# Patient Record
Sex: FEMALE | Race: WHITE | NOT HISPANIC OR LATINO | Employment: FULL TIME | URBAN - METROPOLITAN AREA
[De-identification: names, ages, dates, MRNs, and addresses within clinical notes are randomized per-mention and may not be internally consistent; named-entity substitution may affect disease eponyms.]

---

## 2023-09-07 ENCOUNTER — OFFICE VISIT (OUTPATIENT)
Dept: PRIMARY CARE | Facility: CLINIC | Age: 58
End: 2023-09-07
Payer: COMMERCIAL

## 2023-09-07 ENCOUNTER — LAB (OUTPATIENT)
Dept: LAB | Facility: LAB | Age: 58
End: 2023-09-07
Payer: COMMERCIAL

## 2023-09-07 VITALS
TEMPERATURE: 96.2 F | BODY MASS INDEX: 21.89 KG/M2 | WEIGHT: 131.4 LBS | SYSTOLIC BLOOD PRESSURE: 112 MMHG | HEIGHT: 65 IN | DIASTOLIC BLOOD PRESSURE: 70 MMHG | HEART RATE: 54 BPM

## 2023-09-07 DIAGNOSIS — Z12.11 ENCOUNTER FOR SCREENING FOR MALIGNANT NEOPLASM OF COLON: ICD-10-CM

## 2023-09-07 DIAGNOSIS — Z13.1 SCREENING FOR DIABETES MELLITUS: ICD-10-CM

## 2023-09-07 DIAGNOSIS — Z00.00 ANNUAL PHYSICAL EXAM: Primary | ICD-10-CM

## 2023-09-07 DIAGNOSIS — Z00.00 ANNUAL PHYSICAL EXAM: ICD-10-CM

## 2023-09-07 DIAGNOSIS — Z12.31 SCREENING MAMMOGRAM FOR BREAST CANCER: ICD-10-CM

## 2023-09-07 DIAGNOSIS — Z13.6 SCREENING FOR CARDIOVASCULAR CONDITION: ICD-10-CM

## 2023-09-07 LAB
ALANINE AMINOTRANSFERASE (SGPT) (U/L) IN SER/PLAS: 34 U/L (ref 7–45)
ALBUMIN (G/DL) IN SER/PLAS: 4.7 G/DL (ref 3.4–5)
ALKALINE PHOSPHATASE (U/L) IN SER/PLAS: 73 U/L (ref 33–110)
ANION GAP IN SER/PLAS: 12 MMOL/L (ref 10–20)
ASPARTATE AMINOTRANSFERASE (SGOT) (U/L) IN SER/PLAS: 27 U/L (ref 9–39)
BILIRUBIN TOTAL (MG/DL) IN SER/PLAS: 0.7 MG/DL (ref 0–1.2)
CALCIUM (MG/DL) IN SER/PLAS: 10.4 MG/DL (ref 8.6–10.6)
CARBON DIOXIDE, TOTAL (MMOL/L) IN SER/PLAS: 30 MMOL/L (ref 21–32)
CHLORIDE (MMOL/L) IN SER/PLAS: 106 MMOL/L (ref 98–107)
CHOLESTEROL (MG/DL) IN SER/PLAS: 213 MG/DL (ref 0–199)
CHOLESTEROL IN HDL (MG/DL) IN SER/PLAS: 101.2 MG/DL
CHOLESTEROL/HDL RATIO: 2.1
CREATININE (MG/DL) IN SER/PLAS: 0.81 MG/DL (ref 0.5–1.05)
ERYTHROCYTE DISTRIBUTION WIDTH (RATIO) BY AUTOMATED COUNT: 13.2 % (ref 11.5–14.5)
ERYTHROCYTE MEAN CORPUSCULAR HEMOGLOBIN CONCENTRATION (G/DL) BY AUTOMATED: 31.9 G/DL (ref 32–36)
ERYTHROCYTE MEAN CORPUSCULAR VOLUME (FL) BY AUTOMATED COUNT: 95 FL (ref 80–100)
ERYTHROCYTES (10*6/UL) IN BLOOD BY AUTOMATED COUNT: 4.84 X10E12/L (ref 4–5.2)
ESTIMATED AVERAGE GLUCOSE FOR HBA1C: 108 MG/DL
GFR FEMALE: 83 ML/MIN/1.73M2
GLUCOSE (MG/DL) IN SER/PLAS: 84 MG/DL (ref 74–99)
HEMATOCRIT (%) IN BLOOD BY AUTOMATED COUNT: 46.1 % (ref 36–46)
HEMOGLOBIN (G/DL) IN BLOOD: 14.7 G/DL (ref 12–16)
HEMOGLOBIN A1C/HEMOGLOBIN TOTAL IN BLOOD: 5.4 %
LDL: 99 MG/DL (ref 0–99)
LEUKOCYTES (10*3/UL) IN BLOOD BY AUTOMATED COUNT: 4.5 X10E9/L (ref 4.4–11.3)
NRBC (PER 100 WBCS) BY AUTOMATED COUNT: 0 /100 WBC (ref 0–0)
PLATELETS (10*3/UL) IN BLOOD AUTOMATED COUNT: 226 X10E9/L (ref 150–450)
POTASSIUM (MMOL/L) IN SER/PLAS: 4.8 MMOL/L (ref 3.5–5.3)
PROTEIN TOTAL: 7.4 G/DL (ref 6.4–8.2)
SODIUM (MMOL/L) IN SER/PLAS: 143 MMOL/L (ref 136–145)
TRIGLYCERIDE (MG/DL) IN SER/PLAS: 62 MG/DL (ref 0–149)
UREA NITROGEN (MG/DL) IN SER/PLAS: 15 MG/DL (ref 6–23)
VLDL: 12 MG/DL (ref 0–40)

## 2023-09-07 PROCEDURE — 80061 LIPID PANEL: CPT

## 2023-09-07 PROCEDURE — 1036F TOBACCO NON-USER: CPT | Performed by: INTERNAL MEDICINE

## 2023-09-07 PROCEDURE — 80053 COMPREHEN METABOLIC PANEL: CPT

## 2023-09-07 PROCEDURE — 85027 COMPLETE CBC AUTOMATED: CPT

## 2023-09-07 PROCEDURE — 83036 HEMOGLOBIN GLYCOSYLATED A1C: CPT

## 2023-09-07 PROCEDURE — 36415 COLL VENOUS BLD VENIPUNCTURE: CPT

## 2023-09-07 PROCEDURE — 99396 PREV VISIT EST AGE 40-64: CPT | Performed by: INTERNAL MEDICINE

## 2023-09-07 ASSESSMENT — ENCOUNTER SYMPTOMS
COUGH: 0
FEVER: 0
POLYDIPSIA: 0
PALPITATIONS: 0
SHORTNESS OF BREATH: 0
CHILLS: 0

## 2023-09-07 NOTE — PROGRESS NOTES
"Subjective   Patient ID: Magalys Kay is a 58 y.o. female who presents for Annual Exam.    Patient presents today for an annual wellness exam    Medical history and surgical history updated today  Medication list reconciled and updated  Patient denies vision issues at this time  Patient denies hearing issues at this time  Current diet: balanced  Current exercise habit: regular. Tennis and training.   Follows with a dentist: Yes    Patient counseled about available preventative health vaccinations and provided with opportunity to update them with our office or through prescription to preferred pharmacy.    Reviewed and discussed preventative health screening recommendations for colon cancer    Reviewed and discussed preventative health recommendations for screening for cervical cancer and breast cancer          Review of Systems   Constitutional:  Negative for chills and fever.   Respiratory:  Negative for cough and shortness of breath.    Cardiovascular:  Negative for chest pain and palpitations.   Endocrine: Negative for polydipsia and polyuria.       Objective   /70 (BP Location: Right arm, Patient Position: Sitting, BP Cuff Size: Adult)   Pulse 54   Temp 35.7 °C (96.2 °F) (Temporal)   Ht 1.651 m (5' 5\")   Wt 59.6 kg (131 lb 6.4 oz)   BMI 21.87 kg/m²     Physical Exam  Constitutional:       Appearance: Normal appearance.   HENT:      Head: Normocephalic and atraumatic.      Right Ear: Tympanic membrane normal.      Left Ear: Tympanic membrane normal.      Nose: Nose normal.      Mouth/Throat:      Mouth: Mucous membranes are moist.   Eyes:      Extraocular Movements: Extraocular movements intact.      Conjunctiva/sclera: Conjunctivae normal.      Pupils: Pupils are equal, round, and reactive to light.   Neck:      Thyroid: No thyroid mass, thyromegaly or thyroid tenderness.      Vascular: No carotid bruit.   Cardiovascular:      Rate and Rhythm: Normal rate and regular rhythm.      Heart sounds: No " murmur heard.     No friction rub. No gallop.   Pulmonary:      Effort: Pulmonary effort is normal. No respiratory distress.      Breath sounds: No wheezing, rhonchi or rales.   Abdominal:      General: Bowel sounds are normal.      Palpations: Abdomen is soft.      Tenderness: There is no abdominal tenderness. There is no guarding.      Hernia: No hernia is present.   Musculoskeletal:      Cervical back: Neck supple. No tenderness.      Right lower leg: No edema.      Left lower leg: No edema.   Lymphadenopathy:      Cervical: No cervical adenopathy.   Skin:     Coloration: Skin is not jaundiced.   Neurological:      General: No focal deficit present.      Mental Status: She is alert and oriented to person, place, and time.   Psychiatric:         Mood and Affect: Mood normal.         Assessment/Plan   Problem List Items Addressed This Visit    None  Visit Diagnoses       Annual physical exam    -  Primary    Relevant Orders    Lipid Panel    CBC    Comprehensive Metabolic Panel    Hemoglobin A1C    Screening for cardiovascular condition        Relevant Orders    Lipid Panel    CBC    Comprehensive Metabolic Panel    Hemoglobin A1C    Screening mammogram for breast cancer        Relevant Orders    BI mammo bilateral screening tomosynthesis    Encounter for screening for malignant neoplasm of colon        Relevant Orders    Colonoscopy Screening    Screening for diabetes mellitus        Relevant Orders    Lipid Panel    CBC    Comprehensive Metabolic Panel    Hemoglobin A1C

## 2023-09-08 NOTE — RESULT ENCOUNTER NOTE
Patient labs are excellent.  Her cholesterol is extremely good her good cholesterol is high at 101 and her bad cholesterol is well controlled at 99.  Her good cholesterol exceeds her bad cholesterol.  It certainly is not abnormal in a bad way and approach is nothing remotely like what we discussed at the appointment on that screening she did.  Her levels are perfect so is the rest of her blood work there were no concerns here.  Patient's physical paperwork completed

## 2023-10-19 DIAGNOSIS — Z12.11 SCREENING FOR COLON CANCER: Primary | ICD-10-CM

## 2023-10-19 RX ORDER — POLYETHYLENE GLYCOL 3350, SODIUM SULFATE ANHYDROUS, SODIUM BICARBONATE, SODIUM CHLORIDE, POTASSIUM CHLORIDE 236; 22.74; 6.74; 5.86; 2.97 G/4L; G/4L; G/4L; G/4L; G/4L
4000 POWDER, FOR SOLUTION ORAL ONCE
Qty: 4000 ML | Refills: 0 | Status: SHIPPED | OUTPATIENT
Start: 2023-10-19 | End: 2023-10-19

## 2023-12-08 ENCOUNTER — OFFICE VISIT (OUTPATIENT)
Dept: GASTROENTEROLOGY | Facility: EXTERNAL LOCATION | Age: 58
End: 2023-12-08
Payer: COMMERCIAL

## 2023-12-08 DIAGNOSIS — Z83.719 FAMILY HISTORY OF POLYPS IN THE COLON: ICD-10-CM

## 2023-12-08 DIAGNOSIS — Z12.11 ENCOUNTER FOR SCREENING FOR MALIGNANT NEOPLASM OF COLON: ICD-10-CM

## 2023-12-08 DIAGNOSIS — D12.5 BENIGN NEOPLASM OF SIGMOID COLON: ICD-10-CM

## 2023-12-08 DIAGNOSIS — Z12.11 ENCOUNTER FOR SCREENING FOR MALIGNANT NEOPLASM OF COLON: Primary | ICD-10-CM

## 2023-12-08 PROCEDURE — 1036F TOBACCO NON-USER: CPT | Performed by: INTERNAL MEDICINE

## 2023-12-08 PROCEDURE — 45385 COLONOSCOPY W/LESION REMOVAL: CPT | Performed by: INTERNAL MEDICINE

## 2023-12-08 PROCEDURE — 0753T DGTZ GLS MCRSCP SLD LEVEL IV: CPT

## 2023-12-08 PROCEDURE — 88305 TISSUE EXAM BY PATHOLOGIST: CPT | Performed by: PATHOLOGY

## 2023-12-08 PROCEDURE — 88305 TISSUE EXAM BY PATHOLOGIST: CPT

## 2023-12-08 NOTE — PROGRESS NOTES
Patient had colonoscopy due to family history of polyps and 2 small polyps were removed.  Mild diverticulosis.  I recommend repeat exam in 5 years if any of the polyps are adenomas.

## 2023-12-12 ENCOUNTER — LAB REQUISITION (OUTPATIENT)
Dept: LAB | Facility: HOSPITAL | Age: 58
End: 2023-12-12
Payer: COMMERCIAL

## 2023-12-15 LAB
LABORATORY COMMENT REPORT: NORMAL
PATH REPORT.FINAL DX SPEC: NORMAL
PATH REPORT.GROSS SPEC: NORMAL
PATH REPORT.RELEVANT HX SPEC: NORMAL
PATH REPORT.TOTAL CANCER: NORMAL

## 2023-12-19 ENCOUNTER — OFFICE VISIT (OUTPATIENT)
Dept: DERMATOLOGY | Facility: CLINIC | Age: 58
End: 2023-12-19
Payer: COMMERCIAL

## 2023-12-19 DIAGNOSIS — L81.4 LENTIGO: ICD-10-CM

## 2023-12-19 DIAGNOSIS — D22.9 MULTIPLE BENIGN NEVI: ICD-10-CM

## 2023-12-19 DIAGNOSIS — L85.3 XEROSIS CUTIS: ICD-10-CM

## 2023-12-19 DIAGNOSIS — Z85.828 PERSONAL HISTORY OF SKIN CANCER: ICD-10-CM

## 2023-12-19 DIAGNOSIS — D48.5 NEOPLASM OF UNCERTAIN BEHAVIOR OF SKIN: Primary | ICD-10-CM

## 2023-12-19 DIAGNOSIS — L82.1 SEBORRHEIC KERATOSIS: ICD-10-CM

## 2023-12-19 DIAGNOSIS — L57.8 ACTINIC SKIN DAMAGE: ICD-10-CM

## 2023-12-19 PROBLEM — Z80.8 FAMILY HISTORY OF MELANOMA: Status: ACTIVE | Noted: 2023-12-19

## 2023-12-19 PROBLEM — D23.9 MULTIPLE DYSPLASTIC NEVI: Status: ACTIVE | Noted: 2023-12-19

## 2023-12-19 PROCEDURE — 11102 TANGNTL BX SKIN SINGLE LES: CPT | Performed by: DERMATOLOGY

## 2023-12-19 PROCEDURE — 99213 OFFICE O/P EST LOW 20 MIN: CPT | Performed by: DERMATOLOGY

## 2023-12-19 PROCEDURE — 1036F TOBACCO NON-USER: CPT | Performed by: DERMATOLOGY

## 2023-12-19 PROCEDURE — 88305 TISSUE EXAM BY PATHOLOGIST: CPT | Mod: TC,DER | Performed by: DERMATOLOGY

## 2023-12-19 PROCEDURE — 88305 TISSUE EXAM BY PATHOLOGIST: CPT | Performed by: DERMATOLOGY

## 2023-12-19 ASSESSMENT — PATIENT GLOBAL ASSESSMENT (PGA): PATIENT GLOBAL ASSESSMENT: PATIENT GLOBAL ASSESSMENT:  1 - CLEAR

## 2023-12-19 ASSESSMENT — ITCH NUMERIC RATING SCALE: HOW SEVERE IS YOUR ITCHING?: 0

## 2023-12-19 ASSESSMENT — DERMATOLOGY QUALITY OF LIFE (QOL) ASSESSMENT
ARE THERE EXCLUSIONS OR EXCEPTIONS FOR THE QUALITY OF LIFE ASSESSMENT: NO
RATE HOW BOTHERED YOU ARE BY SYMPTOMS OF YOUR SKIN PROBLEM (EG, ITCHING, STINGING BURNING, HURTING OR SKIN IRRITATION): 0 - NEVER BOTHERED
RATE HOW EMOTIONALLY BOTHERED YOU ARE BY YOUR SKIN PROBLEM (FOR EXAMPLE, WORRY, EMBARRASSMENT, FRUSTRATION): 0 - NEVER BOTHERED
DATE THE QUALITY-OF-LIFE ASSESSMENT WAS COMPLETED: 66827
RATE HOW BOTHERED YOU ARE BY EFFECTS OF YOUR SKIN PROBLEMS ON YOUR ACTIVITIES (EG, GOING OUT, ACCOMPLISHING WHAT YOU WANT, WORK ACTIVITIES OR YOUR RELATIONSHIPS WITH OTHERS): 0 - NEVER BOTHERED

## 2023-12-19 ASSESSMENT — DERMATOLOGY PATIENT ASSESSMENT
HAVE YOU HAD OR DO YOU HAVE VASCULAR DISEASE: NO
DO YOU USE A TANNING BED: YES, PREVIOUSLY
ARE YOU TRYING TO GET PREGNANT: NO
DO YOU USE SUNSCREEN: OCCASIONALLY
DO YOU HAVE ANY NEW OR CHANGING LESIONS: NO
ARE YOU AN ORGAN TRANSPLANT RECIPIENT: NO
ARE YOU ON BIRTH CONTROL: NO
HAVE YOU HAD OR DO YOU HAVE A STAPH INFECTION: NO
DO YOU HAVE IRREGULAR MENSTRUAL CYCLES: NO

## 2023-12-19 NOTE — PROGRESS NOTES
Subjective     Magalys Kay is a 58 y.o. female who presents for the following: Skin Check.     Review of Systems:  No other skin or systemic complaints other than what is documented elsewhere in the note.    The following portions of the chart were reviewed this encounter and updated as appropriate:  Tobacco  Allergies  Meds  Problems  Med Hx  Surg Hx  Fam Hx         Skin Cancer History  No skin cancer on file.      Specialty Problems          Dermatology Problems    Family history of melanoma     Family hx of esophageal melanoma diagnosed at age 60 in her father and brother had amelanoma excised at 45          History of nonmelanoma skin cancer     Discussed niacinamide as prophy    Lesion 1: BCC.Year Diagnosed: 2014. August.Location:  Right Lower Leg.  medial aspectTreatment(s):  Electrodessication and curretage (not here)    Lesion 2: Superficial Basal Cell Carcinoma.  Deep margins involved.Year Diagnosed:  2020. June.Location:  Right Posterior Upper Arm.Treatment(s): ED&C.  Treated by: Leeanne Madsen MD 8/27/2020Pathology: [id number]    Lesion 3: Superficial Basal Cell Carcinoma.  Deep margins involved.Year Diagnosed:  2021. June.Location:  Left Upper Arm.Treatment(s): Treated by: Leeanne Madsen MD 7/28/2021 ED&CPathology: [id number]    Lesion 4: Nodular Basal Cell Carcinoma.  Deep margins involved.Year Diagnosed:  2022. December.Location:  Left [place].Treatment(s): mohsSurgeon: Rudolph2/1/23Pathology: [id number]          Multiple dysplastic nevi     Hx of multiple dyplastic nevi, acral compound nevi with pagetoid extension s/p re-excision onright dorsal foot in 2015              Objective   Well appearing patient in no apparent distress; mood and affect are within normal limits.    A full examination was performed including scalp, head, eyes, ears, nose, lips, neck, chest, axillae, abdomen, back, buttocks, bilateral upper extremities, bilateral lower extremities, hands, feet, fingers, toes,  fingernails, and toenails. All findings within normal limits unless otherwise noted below.    Assessment/Plan   1. Neoplasm of uncertain behavior of skin  Left Buccal Cheek  4 mm pink flat-topped papule with yellow globules and pink white  on dermosocpy          Lesion biopsy  Type of biopsy: tangential    Informed consent: discussed and consent obtained    Timeout: patient name, date of birth, surgical site, and procedure verified    Procedure prep:  Patient was prepped and draped  Anesthesia: the lesion was anesthetized in a standard fashion    Anesthetic:  1% lidocaine w/ epinephrine 1-100,000 local infiltration  Instrument used: DermaBlade    Hemostasis achieved with: aluminum chloride    Outcome: patient tolerated procedure well    Post-procedure details: sterile dressing applied and wound care instructions given    Dressing type: petrolatum and bandage      Staff Communication: Dermatology Local Anesthesia: 1 % Lidocaine / Epinephrine - Amount: 1.5 ml    Specimen 1 - Dermatopathology- DERM LAB  Differential Diagnosis: folliculitis vs seb hyp vs basal cell carcinoma   Check Margins Yes/No?:    Comments:    Dermpath Lab: Routine Histopathology (formalin-fixed tissue)    2. Actinic skin damage  Right Dorsal Hand  Background of photodamage with hyper- and hypo-pigmented macules on the skin    AK biopsied at last visit completely resolved    3. Multiple benign nevi  Brown and tan macules and papules with reassuring findings on dermoscopy    -These lesions have benign, reassuring patterns on dermoscopy  -Recommend continued self observation, and to contact the office if any changes in nevi are noticed    4. Lentigo  Tan macules    -Benign appearing on exam  -Reassurance, recommend observation    5. Seborrheic keratosis  Stuck on, waxy macule(s)/papule(s)/plaque(s) with comedo-like openings and milia like cysts    -Discussed the nature of the diagnosis  -Reassurance, recommend continued observation    6. Personal  history of skin cancer    Personal History of Non-Melanoma Skin Cancer  -Well healed scar(s) with no evidence of recurrence  -Discussed the need for annual or semi-annual skin examinations and to return sooner if any new or changing lesions are noticed. Patient verbalizes understanding    Related Procedures  Follow Up In Dermatology - Established Patient    7. Xerosis cutis  Dry skin with erythema and dry river bed cracking    -Discussed nature of condition  -Discussed gentle skin care habits     - change to Dove soap and CeraVe cream  - shorten showers and not too hot  - info in AVS  - if these changes do not help, can request triamcinolone 0.1% cream BID x2 weeks          Follow up 6 months Full Skin Exam or sooner pending path; if path normal could consider spacing out after next visit

## 2023-12-21 LAB
LABORATORY COMMENT REPORT: NORMAL
PATH REPORT.FINAL DX SPEC: NORMAL
PATH REPORT.GROSS SPEC: NORMAL
PATH REPORT.MICROSCOPIC SPEC OTHER STN: NORMAL
PATH REPORT.RELEVANT HX SPEC: NORMAL
PATH REPORT.TOTAL CANCER: NORMAL

## 2023-12-26 ENCOUNTER — TELEPHONE (OUTPATIENT)
Dept: DERMATOLOGY | Facility: CLINIC | Age: 58
End: 2023-12-26
Payer: COMMERCIAL

## 2024-01-08 ENCOUNTER — ANCILLARY PROCEDURE (OUTPATIENT)
Dept: RADIOLOGY | Facility: CLINIC | Age: 59
End: 2024-01-08
Payer: COMMERCIAL

## 2024-01-08 DIAGNOSIS — Z12.31 SCREENING MAMMOGRAM FOR BREAST CANCER: ICD-10-CM

## 2024-01-08 PROCEDURE — 77067 SCR MAMMO BI INCL CAD: CPT | Mod: BILATERAL PROCEDURE | Performed by: RADIOLOGY

## 2024-01-08 PROCEDURE — 77063 BREAST TOMOSYNTHESIS BI: CPT | Mod: BILATERAL PROCEDURE | Performed by: RADIOLOGY

## 2024-01-08 PROCEDURE — 77063 BREAST TOMOSYNTHESIS BI: CPT

## 2024-01-11 ENCOUNTER — OFFICE VISIT (OUTPATIENT)
Dept: PRIMARY CARE | Facility: CLINIC | Age: 59
End: 2024-01-11
Payer: COMMERCIAL

## 2024-01-11 VITALS
BODY MASS INDEX: 22.82 KG/M2 | DIASTOLIC BLOOD PRESSURE: 66 MMHG | HEART RATE: 63 BPM | HEIGHT: 65 IN | WEIGHT: 137 LBS | SYSTOLIC BLOOD PRESSURE: 106 MMHG | TEMPERATURE: 98 F

## 2024-01-11 DIAGNOSIS — R05.2 SUBACUTE COUGH: Primary | ICD-10-CM

## 2024-01-11 PROCEDURE — 1036F TOBACCO NON-USER: CPT | Performed by: INTERNAL MEDICINE

## 2024-01-11 PROCEDURE — 99213 OFFICE O/P EST LOW 20 MIN: CPT | Performed by: INTERNAL MEDICINE

## 2024-01-11 PROCEDURE — 87631 RESP VIRUS 3-5 TARGETS: CPT

## 2024-01-11 RX ORDER — CODEINE PHOSPHATE AND GUAIFENESIN 10; 100 MG/5ML; MG/5ML
5 SOLUTION ORAL NIGHTLY PRN
Qty: 50 ML | Refills: 0 | Status: SHIPPED | OUTPATIENT
Start: 2024-01-11 | End: 2024-01-18

## 2024-01-11 RX ORDER — BENZONATATE 100 MG/1
100 CAPSULE ORAL 3 TIMES DAILY PRN
Qty: 21 CAPSULE | Refills: 0 | Status: SHIPPED | OUTPATIENT
Start: 2024-01-11 | End: 2024-01-18

## 2024-01-11 RX ORDER — PREDNISONE 10 MG/1
TABLET ORAL
Qty: 18 TABLET | Refills: 0 | Status: SHIPPED | OUTPATIENT
Start: 2024-01-11 | End: 2024-06-03 | Stop reason: WASHOUT

## 2024-01-11 RX ORDER — DOXYCYCLINE 100 MG/1
100 CAPSULE ORAL 2 TIMES DAILY
Qty: 14 CAPSULE | Refills: 0 | Status: SHIPPED | OUTPATIENT
Start: 2024-01-11 | End: 2024-01-18

## 2024-01-11 ASSESSMENT — ENCOUNTER SYMPTOMS
PALPITATIONS: 0
FATIGUE: 1
SHORTNESS OF BREATH: 0
COUGH: 1
CHILLS: 0
POLYDIPSIA: 0
FEVER: 0

## 2024-01-11 NOTE — PROGRESS NOTES
"Subjective   Patient ID: Magalys Kay is a 58 y.o. female who presents for Cough (Since last week).    1 week ago, throat tickle with a cough onset 24 hours later. Cough worsening over that time. Deep cough. Mucus in throat but not enough to get out.  Positive for fatigue negative for chills sweats fevers severe shortness of breath.  Cough reduced to some chest pain but no chest pain otherwise.    Cough  Pertinent negatives include no chest pain, chills, fever or shortness of breath.        Review of Systems   Constitutional:  Positive for fatigue. Negative for chills and fever.   Respiratory:  Positive for cough. Negative for shortness of breath.    Cardiovascular:  Negative for chest pain and palpitations.   Endocrine: Negative for polydipsia and polyuria.       Objective   /66 (BP Location: Right arm, Patient Position: Sitting, BP Cuff Size: Adult)   Pulse 63   Temp 36.7 °C (98 °F) (Temporal)   Ht 1.651 m (5' 5\")   Wt 62.1 kg (137 lb)   BMI 22.80 kg/m²     Physical Exam  Constitutional:       Appearance: Normal appearance.   HENT:      Head: Normocephalic and atraumatic.   Eyes:      Extraocular Movements: Extraocular movements intact.      Pupils: Pupils are equal, round, and reactive to light.   Neck:      Thyroid: No thyroid mass or thyromegaly.      Vascular: No carotid bruit.   Cardiovascular:      Rate and Rhythm: Normal rate and regular rhythm.      Heart sounds: No murmur heard.     No friction rub. No gallop.   Pulmonary:      Effort: No respiratory distress.      Breath sounds: Rhonchi present. No wheezing or rales.   Musculoskeletal:      Cervical back: Neck supple.      Right lower leg: No edema.      Left lower leg: No edema.   Lymphadenopathy:      Cervical: No cervical adenopathy.   Neurological:      Mental Status: She is alert.         Assessment/Plan   Problem List Items Addressed This Visit    None  Visit Diagnoses         Codes    Subacute cough    -  Primary R05.2    Relevant " Medications    predniSONE (Deltasone) 10 mg tablet    benzonatate (Tessalon) 100 mg capsule    codeine-guaifenesin (Robitussin-AC)  mg/5 mL syrup    doxycycline (Vibramycin) 100 mg capsule    Other Relevant Orders    Influenza A, and B PCR    RSV PCR

## 2024-01-12 LAB
FLUAV RNA RESP QL NAA+PROBE: NOT DETECTED
FLUBV RNA RESP QL NAA+PROBE: NOT DETECTED
RSV RNA RESP QL NAA+PROBE: NOT DETECTED

## 2024-06-03 ENCOUNTER — LAB (OUTPATIENT)
Dept: LAB | Facility: LAB | Age: 59
End: 2024-06-03
Payer: COMMERCIAL

## 2024-06-03 ENCOUNTER — OFFICE VISIT (OUTPATIENT)
Dept: PRIMARY CARE | Facility: CLINIC | Age: 59
End: 2024-06-03
Payer: COMMERCIAL

## 2024-06-03 ENCOUNTER — HOSPITAL ENCOUNTER (OUTPATIENT)
Dept: RADIOLOGY | Facility: CLINIC | Age: 59
Discharge: HOME | End: 2024-06-03
Payer: COMMERCIAL

## 2024-06-03 VITALS
WEIGHT: 138 LBS | DIASTOLIC BLOOD PRESSURE: 59 MMHG | SYSTOLIC BLOOD PRESSURE: 118 MMHG | HEART RATE: 51 BPM | BODY MASS INDEX: 22.96 KG/M2 | TEMPERATURE: 97.3 F

## 2024-06-03 DIAGNOSIS — M79.644 PAIN IN FINGER OF RIGHT HAND: ICD-10-CM

## 2024-06-03 DIAGNOSIS — R10.10 PAIN OF UPPER ABDOMEN: Primary | ICD-10-CM

## 2024-06-03 DIAGNOSIS — R19.5 LOOSE STOOLS: ICD-10-CM

## 2024-06-03 DIAGNOSIS — R10.10 PAIN OF UPPER ABDOMEN: ICD-10-CM

## 2024-06-03 DIAGNOSIS — M79.641 RIGHT HAND PAIN: ICD-10-CM

## 2024-06-03 LAB
ALBUMIN SERPL BCP-MCNC: 4.5 G/DL (ref 3.4–5)
ALP SERPL-CCNC: 65 U/L (ref 33–136)
ALT SERPL W P-5'-P-CCNC: 19 U/L (ref 7–45)
ANION GAP SERPL CALC-SCNC: 12 MMOL/L (ref 10–20)
AST SERPL W P-5'-P-CCNC: 19 U/L (ref 9–39)
BASOPHILS # BLD AUTO: 0.03 X10*3/UL (ref 0–0.1)
BASOPHILS NFR BLD AUTO: 0.7 %
BILIRUB SERPL-MCNC: 0.5 MG/DL (ref 0–1.2)
BUN SERPL-MCNC: 17 MG/DL (ref 6–23)
CALCIUM SERPL-MCNC: 10.2 MG/DL (ref 8.6–10.6)
CHLORIDE SERPL-SCNC: 104 MMOL/L (ref 98–107)
CO2 SERPL-SCNC: 29 MMOL/L (ref 21–32)
CREAT SERPL-MCNC: 0.76 MG/DL (ref 0.5–1.05)
EGFRCR SERPLBLD CKD-EPI 2021: 90 ML/MIN/1.73M*2
EOSINOPHIL # BLD AUTO: 0.07 X10*3/UL (ref 0–0.7)
EOSINOPHIL NFR BLD AUTO: 1.5 %
ERYTHROCYTE [DISTWIDTH] IN BLOOD BY AUTOMATED COUNT: 13 % (ref 11.5–14.5)
GLUCOSE SERPL-MCNC: 98 MG/DL (ref 74–99)
HCT VFR BLD AUTO: 44.6 % (ref 36–46)
HGB BLD-MCNC: 14.8 G/DL (ref 12–16)
IMM GRANULOCYTES # BLD AUTO: 0.01 X10*3/UL (ref 0–0.7)
IMM GRANULOCYTES NFR BLD AUTO: 0.2 % (ref 0–0.9)
LIPASE SERPL-CCNC: 33 U/L (ref 9–82)
LYMPHOCYTES # BLD AUTO: 1.71 X10*3/UL (ref 1.2–4.8)
LYMPHOCYTES NFR BLD AUTO: 37.3 %
MCH RBC QN AUTO: 31.3 PG (ref 26–34)
MCHC RBC AUTO-ENTMCNC: 33.2 G/DL (ref 32–36)
MCV RBC AUTO: 94 FL (ref 80–100)
MONOCYTES # BLD AUTO: 0.34 X10*3/UL (ref 0.1–1)
MONOCYTES NFR BLD AUTO: 7.4 %
NEUTROPHILS # BLD AUTO: 2.43 X10*3/UL (ref 1.2–7.7)
NEUTROPHILS NFR BLD AUTO: 52.9 %
NRBC BLD-RTO: 0 /100 WBCS (ref 0–0)
PLATELET # BLD AUTO: 220 X10*3/UL (ref 150–450)
POTASSIUM SERPL-SCNC: 4.4 MMOL/L (ref 3.5–5.3)
PROT SERPL-MCNC: 6.8 G/DL (ref 6.4–8.2)
RBC # BLD AUTO: 4.73 X10*6/UL (ref 4–5.2)
SODIUM SERPL-SCNC: 141 MMOL/L (ref 136–145)
WBC # BLD AUTO: 4.6 X10*3/UL (ref 4.4–11.3)

## 2024-06-03 PROCEDURE — 80053 COMPREHEN METABOLIC PANEL: CPT

## 2024-06-03 PROCEDURE — 1036F TOBACCO NON-USER: CPT | Performed by: INTERNAL MEDICINE

## 2024-06-03 PROCEDURE — 83690 ASSAY OF LIPASE: CPT

## 2024-06-03 PROCEDURE — 36415 COLL VENOUS BLD VENIPUNCTURE: CPT

## 2024-06-03 PROCEDURE — 73130 X-RAY EXAM OF HAND: CPT | Mod: RT

## 2024-06-03 PROCEDURE — 85025 COMPLETE CBC W/AUTO DIFF WBC: CPT

## 2024-06-03 PROCEDURE — 73130 X-RAY EXAM OF HAND: CPT | Mod: RIGHT SIDE | Performed by: RADIOLOGY

## 2024-06-03 PROCEDURE — 99214 OFFICE O/P EST MOD 30 MIN: CPT | Performed by: INTERNAL MEDICINE

## 2024-06-03 RX ORDER — PANTOPRAZOLE SODIUM 40 MG/1
40 TABLET, DELAYED RELEASE ORAL 2 TIMES DAILY
Qty: 60 TABLET | Refills: 0 | Status: SHIPPED | OUTPATIENT
Start: 2024-06-03 | End: 2024-07-03

## 2024-06-03 RX ORDER — SUCRALFATE 1 G/1
1 TABLET ORAL
Qty: 28 TABLET | Refills: 0 | Status: SHIPPED | OUTPATIENT
Start: 2024-06-03 | End: 2024-06-10

## 2024-06-03 ASSESSMENT — ENCOUNTER SYMPTOMS: ABDOMINAL PAIN: 1

## 2024-06-03 NOTE — PROGRESS NOTES
Subjective   Patient ID: Magalys Kay is a 59 y.o. female who presents for Hand Pain (right) and Abdominal Pain (month).    Bilateral upper quadrant abdominal pain x1 month. No severe, but persistent, no nausea or vomiting. No food intolerance. Associated with increased bowel movements, but not diarrhea. Multiple small soft bowel movements, abnormal for her who is usually constipated. Symptoms usually start after morning coffee typically. Improve some during the daytime. Pattern continues daily.     Hand Pain     Abdominal Pain         Review of Systems   Gastrointestinal:  Positive for abdominal pain (bilateral upper quadrants).       Objective   /59 (BP Location: Left arm, Patient Position: Sitting, BP Cuff Size: Adult)   Pulse 51   Temp 36.3 °C (97.3 °F) (Temporal)   Wt 62.6 kg (138 lb)   BMI 22.96 kg/m²     Physical Exam  Constitutional:       Appearance: Normal appearance.   HENT:      Head: Normocephalic and atraumatic.   Eyes:      Extraocular Movements: Extraocular movements intact.      Pupils: Pupils are equal, round, and reactive to light.   Neck:      Thyroid: No thyroid mass or thyromegaly.      Vascular: No carotid bruit.   Cardiovascular:      Rate and Rhythm: Normal rate and regular rhythm.   Musculoskeletal:      Cervical back: Neck supple.      Right lower leg: No edema.      Left lower leg: No edema.   Lymphadenopathy:      Cervical: No cervical adenopathy.   Neurological:      Mental Status: She is alert.         Assessment/Plan   Problem List Items Addressed This Visit    None  Visit Diagnoses         Codes    Pain of upper abdomen    -  Primary R10.10    Relevant Medications    pantoprazole (ProtoNix) 40 mg EC tablet    sucralfate (Carafate) 1 gram tablet    Other Relevant Orders    Comprehensive Metabolic Panel    CBC and Auto Differential    H. pylori antigen, stool    US right upper quadrant    Lipase    Loose stools     R19.5    Relevant Medications    pantoprazole (ProtoNix) 40  mg EC tablet    sucralfate (Carafate) 1 gram tablet    Other Relevant Orders    Comprehensive Metabolic Panel    CBC and Auto Differential    H. pylori antigen, stool    US right upper quadrant    Lipase    Right hand pain     M79.641    Relevant Orders    XR hand right 3+ views    Pain in finger of right hand     M79.644    Relevant Orders    XR hand right 3+ views

## 2024-06-03 NOTE — PATIENT INSTRUCTIONS
Pantoprazole- do not start until you collect the stool sample please.   Sucralfate- ok to start immediately.     Consider Voltaren gel for the R hand joint discomfort.

## 2024-06-04 ENCOUNTER — LAB (OUTPATIENT)
Dept: LAB | Facility: LAB | Age: 59
End: 2024-06-04
Payer: COMMERCIAL

## 2024-06-04 DIAGNOSIS — R19.5 LOOSE STOOLS: ICD-10-CM

## 2024-06-04 DIAGNOSIS — R10.10 PAIN OF UPPER ABDOMEN: ICD-10-CM

## 2024-06-04 PROCEDURE — 87449 NOS EACH ORGANISM AG IA: CPT

## 2024-06-05 LAB — H PYLORI AG STL QL IA: NEGATIVE

## 2024-06-13 ENCOUNTER — HOSPITAL ENCOUNTER (OUTPATIENT)
Dept: RADIOLOGY | Facility: CLINIC | Age: 59
Discharge: HOME | End: 2024-06-13
Payer: COMMERCIAL

## 2024-06-13 DIAGNOSIS — R19.5 LOOSE STOOLS: ICD-10-CM

## 2024-06-13 DIAGNOSIS — R10.10 PAIN OF UPPER ABDOMEN: ICD-10-CM

## 2024-06-13 PROCEDURE — 76705 ECHO EXAM OF ABDOMEN: CPT

## 2024-06-13 PROCEDURE — 76705 ECHO EXAM OF ABDOMEN: CPT | Performed by: RADIOLOGY

## 2024-06-17 PROBLEM — K82.4 GALLBLADDER POLYP: Status: ACTIVE | Noted: 2024-06-17

## 2024-06-18 ENCOUNTER — APPOINTMENT (OUTPATIENT)
Dept: DERMATOLOGY | Facility: CLINIC | Age: 59
End: 2024-06-18
Payer: COMMERCIAL

## 2024-06-18 DIAGNOSIS — Z12.83 SCREENING EXAM FOR SKIN CANCER: ICD-10-CM

## 2024-06-18 DIAGNOSIS — L57.8 ACTINIC SKIN DAMAGE: ICD-10-CM

## 2024-06-18 DIAGNOSIS — L82.1 SEBORRHEIC KERATOSIS: ICD-10-CM

## 2024-06-18 DIAGNOSIS — D22.9 MULTIPLE BENIGN NEVI: Primary | ICD-10-CM

## 2024-06-18 DIAGNOSIS — L81.4 LENTIGO: ICD-10-CM

## 2024-06-18 DIAGNOSIS — Z85.820 PERSONAL HISTORY OF MALIGNANT MELANOMA OF SKIN: ICD-10-CM

## 2024-06-18 DIAGNOSIS — Z85.828 PERSONAL HISTORY OF SKIN CANCER: ICD-10-CM

## 2024-06-18 DIAGNOSIS — D48.5 NEOPLASM OF UNCERTAIN BEHAVIOR OF SKIN: ICD-10-CM

## 2024-06-18 PROCEDURE — 99213 OFFICE O/P EST LOW 20 MIN: CPT | Performed by: DERMATOLOGY

## 2024-06-18 PROCEDURE — 11102 TANGNTL BX SKIN SINGLE LES: CPT | Performed by: DERMATOLOGY

## 2024-06-18 PROCEDURE — 11103 TANGNTL BX SKIN EA SEP/ADDL: CPT | Performed by: DERMATOLOGY

## 2024-06-18 RX ORDER — SUCRALFATE 1 G/1
1 TABLET ORAL
COMMUNITY

## 2024-06-18 NOTE — PROGRESS NOTES
Subjective     Magalys Kay is a 59 y.o. female who presents for the following: Skin Check (Patient is here for 6 month skin check. Father  from Melanoma. Brother has a history of Melanoma.).     Review of Systems:  No other skin or systemic complaints other than what is documented elsewhere in the note.    The following portions of the chart were reviewed this encounter and updated as appropriate:         Skin Cancer History  No skin cancer on file.      Specialty Problems          Dermatology Problems    Family history of melanoma     Family hx of esophageal melanoma diagnosed at age 60 in her father and brother had amelanoma excised at 45          History of nonmelanoma skin cancer     Discussed niacinamide as prophy    Lesion 1: BCC.Year Diagnosed: .Location:  Right Lower Leg.  medial aspectTreatment(s):  Electrodessication and curretage (not here)    Lesion 2: Superficial Basal Cell Carcinoma.  Deep margins involved.Year Diagnosed:  .Location:  Right Posterior Upper Arm.Treatment(s): ED&C.  Treated by: Leeanne Madsen MD 2020Pathology: [id number]    Lesion 3: Superficial Basal Cell Carcinoma.  Deep margins involved.Year Diagnosed:  .Location:  Left Upper Arm.Treatment(s): Treated by: Leeanne Madsen MD 2021 ED&CPathology: [id number]    Lesion 4: Nodular Basal Cell Carcinoma.  Deep margins involved.Year Diagnosed:  .Location:  Left [place].Treatment(s): mohsSlesteron: [id number]: [id number]          Multiple dysplastic nevi     Hx of multiple dyplastic nevi, acral compound nevi with pagetoid extension s/p re-excision onright dorsal foot in               Objective   Well appearing patient in no apparent distress; mood and affect are within normal limits.    A full examination was performed including scalp, head, eyes, ears, nose, lips, neck, chest, axillae, abdomen, back, buttocks, bilateral upper extremities, bilateral lower extremities,  hands, feet, fingers, toes, fingernails, and toenails. All findings within normal limits unless otherwise noted below.    Assessment/Plan   1. Multiple benign nevi  Brown and tan macules and papules with reassuring findings on dermoscopy    -These lesions have benign, reassuring patterns on dermoscopy  -Recommend continued self observation, and to contact the office if any changes in nevi are noticed    2. Personal history of skin cancer    Personal History of Non-Melanoma Skin Cancer  -Well healed scar(s) with no evidence of recurrence  -Discussed the need for annual or semi-annual skin examinations and to return sooner if any new or changing lesions are noticed. Patient verbalizes understanding    Related Procedures  Follow Up In Dermatology - Established Patient    3. Lentigo  Tan macules    -Benign appearing on exam  -Reassurance, recommend observation    4. Seborrheic keratosis  Stuck on, waxy macule(s)/papule(s)/plaque(s) with comedo-like openings and milia like cysts    -Discussed the nature of the diagnosis  -Reassurance, recommend continued observation    5. Actinic skin damage  Background of photodamage with hyper- and hypo-pigmented macules on the skin    6. Personal history of malignant melanoma of skin  Lymph node basins palpated in relevant regions without appreciable adenopathy; regions include the neck and/or supraclavicular and/or axillary and/or inguinal and/or popliteal skin.    Personal History of Malignant Melanoma  -Well healed scar(s) with no evidence of recurrence  -Discussed the need for regular full skin examinations in the office, and monthly self examinations at home  -Discussed the need for annual ophthalmology exams with dilation  -Discussed concerning lesions and when to return sooner if any changes noted in skin lesions. Patient verbalizes understanding.    7. Screening exam for skin cancer      Full body skin exam  -No lesions concerning for malignancy on the remainder the skin exam  today   - The ugly duckling sign was discussed. Monitor for any skin lesions that are different in color, shape, or size than others on body  -Sun protection was discussed. Recommend SPF 30+, hats with brims, sun protective clothing, and avoiding sun exposure between 10 AM and 2 PM whenever possible  -Recommend regular skin exams or sooner if new or changing lesions       Related Procedures  Follow Up In Dermatology - Established Patient    8. Neoplasm of uncertain behavior of skin (2)  Left Clavicle  0.3 skin-colored papules with brown globules              Lesion biopsy  Type of biopsy: tangential    Informed consent: discussed and consent obtained    Timeout: patient name, date of birth, surgical site, and procedure verified    Procedure prep:  Patient was prepped and draped  Anesthesia: the lesion was anesthetized in a standard fashion    Anesthetic:  1% lidocaine w/ epinephrine 1-100,000 local infiltration  Instrument used: DermaBlade    Hemostasis achieved with: aluminum chloride    Outcome: patient tolerated procedure well    Post-procedure details: sterile dressing applied and wound care instructions given    Dressing type: petrolatum and bandage      Staff Communication: Dermatology Local Anesthesia: Lidocaine 0.5% with Epinephrine 1;200,000 - Amount: up to 4 ml    Specimen 1 - Dermatopathology- DERM LAB  Differential Diagnosis: basal cell carcinoma vs seborrheic keratosis vs melanoma  Check Margins Yes/No?:    Comments:    Dermpath Lab: Routine Histopathology (formalin-fixed tissue)    Left Upper Back  0.4 cm pink firm shiny papule              Staff Communication: Dermatology Local Anesthesia: Lidocaine 0.5% with Epinephrine 1;200,000 - Amount: up to 4 ml    Lesion biopsy  Type of biopsy: tangential    Informed consent: discussed and consent obtained    Timeout: patient name, date of birth, surgical site, and procedure verified    Procedure prep:  Patient was prepped and draped  Anesthesia: the lesion was  anesthetized in a standard fashion    Anesthetic:  1% lidocaine w/ epinephrine 1-100,000 local infiltration  Instrument used: DermaBlade    Hemostasis achieved with: aluminum chloride    Outcome: patient tolerated procedure well    Post-procedure details: sterile dressing applied and wound care instructions given    Dressing type: petrolatum and bandage      Specimen 2 - Dermatopathology- DERM LAB  Differential Diagnosis: intradermal nevus vs cyst vs basal cell carcinoma vs melanoma  Check Margins Yes/No?:    Comments:    Dermpath Lab: Routine Histopathology (formalin-fixed tissue)        Follow up 6 months Full Skin Exam

## 2024-06-26 DIAGNOSIS — R10.10 PAIN OF UPPER ABDOMEN: ICD-10-CM

## 2024-06-26 DIAGNOSIS — R19.5 LOOSE STOOLS: ICD-10-CM

## 2024-06-28 RX ORDER — PANTOPRAZOLE SODIUM 40 MG/1
40 TABLET, DELAYED RELEASE ORAL 2 TIMES DAILY
Qty: 60 TABLET | Refills: 0 | Status: SHIPPED | OUTPATIENT
Start: 2024-06-28 | End: 2024-07-28

## 2024-08-16 ENCOUNTER — APPOINTMENT (OUTPATIENT)
Dept: DERMATOLOGY | Facility: CLINIC | Age: 59
End: 2024-08-16
Payer: COMMERCIAL

## 2024-08-16 DIAGNOSIS — C44.519 BASAL CELL CARCINOMA (BCC) OF SKIN OF OTHER PART OF TORSO: Primary | ICD-10-CM

## 2024-08-16 PROCEDURE — 1036F TOBACCO NON-USER: CPT | Performed by: DERMATOLOGY

## 2024-08-16 PROCEDURE — 17261 DSTRJ MAL LES T/A/L .6-1.0CM: CPT | Performed by: DERMATOLOGY

## 2024-08-16 ASSESSMENT — DERMATOLOGY PATIENT ASSESSMENT
DO YOU USE A TANNING BED: NO
HAVE YOU HAD OR DO YOU HAVE VASCULAR DISEASE: NO
ARE YOU ON BIRTH CONTROL: NO
ARE YOU TRYING TO GET PREGNANT: NO
HAVE YOU HAD OR DO YOU HAVE A STAPH INFECTION: NO
ARE YOU AN ORGAN TRANSPLANT RECIPIENT: NO
DO YOU HAVE ANY NEW OR CHANGING LESIONS: NO
DO YOU HAVE IRREGULAR MENSTRUAL CYCLES: NO

## 2024-08-16 ASSESSMENT — DERMATOLOGY QUALITY OF LIFE (QOL) ASSESSMENT
DATE THE QUALITY-OF-LIFE ASSESSMENT WAS COMPLETED: 67068
RATE HOW EMOTIONALLY BOTHERED YOU ARE BY YOUR SKIN PROBLEM (FOR EXAMPLE, WORRY, EMBARRASSMENT, FRUSTRATION): 0 - NEVER BOTHERED
RATE HOW BOTHERED YOU ARE BY SYMPTOMS OF YOUR SKIN PROBLEM (EG, ITCHING, STINGING BURNING, HURTING OR SKIN IRRITATION): 0 - NEVER BOTHERED
ARE THERE EXCLUSIONS OR EXCEPTIONS FOR THE QUALITY OF LIFE ASSESSMENT: NO
RATE HOW BOTHERED YOU ARE BY EFFECTS OF YOUR SKIN PROBLEMS ON YOUR ACTIVITIES (EG, GOING OUT, ACCOMPLISHING WHAT YOU WANT, WORK ACTIVITIES OR YOUR RELATIONSHIPS WITH OTHERS): 0 - NEVER BOTHERED

## 2024-08-16 ASSESSMENT — ITCH NUMERIC RATING SCALE: HOW SEVERE IS YOUR ITCHING?: 0

## 2024-08-16 ASSESSMENT — PATIENT GLOBAL ASSESSMENT (PGA): PATIENT GLOBAL ASSESSMENT: PATIENT GLOBAL ASSESSMENT:  1 - CLEAR

## 2024-08-16 NOTE — PROGRESS NOTES
Excision Operative Note    Date of Surgery:  8/16/2024  Surgeon:  Leeanne Madsen MD    Biopsy 6/18/24    A. SKIN, LEFT CLAVICLE, SHAVE BIOPSY:  BASAL CELL CARCINOMA, NODULAR GROWTH PATTERN, LESS THAN 0.1 MM FROM THE DEEP MARGIN.    Assessment/Plan   Basal cell carcinoma (BCC) of skin of other part of torso  Left clavicle    Destr of lesion  Complexity: simple    Destruction method: electrodesiccation and curettage    Informed consent: discussed and consent obtained    Timeout:  patient name, date of birth, surgical site, and procedure verified  Procedure prep:  Patient was prepped and draped  Anesthesia: the lesion was anesthetized in a standard fashion    Anesthetic:  1% lidocaine w/ epinephrine 1-100,000 local infiltration  Curettage performed in three different directions: Yes    Electrodesiccation performed over the curetted area: Yes    Curettage cycles:  3  Lesion length (cm):  0.3  Lesion width (cm):  0.3  Margin per side (cm):  0.3  Final wound size (cm):  0.9  Hemostasis achieved with:  electrodesiccation  Outcome: patient tolerated procedure well with no complications    Post-procedure details: sterile dressing applied and wound care instructions given    Dressing type: petrolatum and bandage        Follow up 12/12/24 for Full Skin Exam as scheduled

## 2024-09-12 ENCOUNTER — APPOINTMENT (OUTPATIENT)
Dept: PRIMARY CARE | Facility: CLINIC | Age: 59
End: 2024-09-12
Payer: COMMERCIAL

## 2024-09-12 ENCOUNTER — LAB (OUTPATIENT)
Dept: LAB | Facility: LAB | Age: 59
End: 2024-09-12
Payer: COMMERCIAL

## 2024-09-12 VITALS
SYSTOLIC BLOOD PRESSURE: 107 MMHG | DIASTOLIC BLOOD PRESSURE: 68 MMHG | TEMPERATURE: 98 F | HEIGHT: 65 IN | HEART RATE: 54 BPM | BODY MASS INDEX: 22.42 KG/M2 | WEIGHT: 134.6 LBS

## 2024-09-12 DIAGNOSIS — M12.811 ROTATOR CUFF ARTHROPATHY, RIGHT: ICD-10-CM

## 2024-09-12 DIAGNOSIS — K82.4 GALLBLADDER POLYP: ICD-10-CM

## 2024-09-12 DIAGNOSIS — Z13.6 SCREENING FOR CARDIOVASCULAR CONDITION: ICD-10-CM

## 2024-09-12 DIAGNOSIS — Z13.1 SCREENING FOR DIABETES MELLITUS: ICD-10-CM

## 2024-09-12 DIAGNOSIS — Z00.00 ANNUAL PHYSICAL EXAM: Primary | ICD-10-CM

## 2024-09-12 DIAGNOSIS — Z12.31 SCREENING MAMMOGRAM FOR BREAST CANCER: ICD-10-CM

## 2024-09-12 DIAGNOSIS — M76.31 IT BAND SYNDROME, RIGHT: ICD-10-CM

## 2024-09-12 LAB
CHOLEST SERPL-MCNC: 213 MG/DL (ref 0–199)
CHOLESTEROL/HDL RATIO: 2.2
EST. AVERAGE GLUCOSE BLD GHB EST-MCNC: 108 MG/DL
HBA1C MFR BLD: 5.4 %
HDLC SERPL-MCNC: 95.3 MG/DL
LDLC SERPL CALC-MCNC: 106 MG/DL
NON HDL CHOLESTEROL: 118 MG/DL (ref 0–149)
TRIGL SERPL-MCNC: 57 MG/DL (ref 0–149)
VLDL: 11 MG/DL (ref 0–40)

## 2024-09-12 PROCEDURE — 99396 PREV VISIT EST AGE 40-64: CPT | Performed by: INTERNAL MEDICINE

## 2024-09-12 PROCEDURE — 83036 HEMOGLOBIN GLYCOSYLATED A1C: CPT

## 2024-09-12 PROCEDURE — 1036F TOBACCO NON-USER: CPT | Performed by: INTERNAL MEDICINE

## 2024-09-12 PROCEDURE — 80061 LIPID PANEL: CPT

## 2024-09-12 PROCEDURE — 3008F BODY MASS INDEX DOCD: CPT | Performed by: INTERNAL MEDICINE

## 2024-09-12 PROCEDURE — 36415 COLL VENOUS BLD VENIPUNCTURE: CPT

## 2024-09-12 RX ORDER — DICLOFENAC SODIUM 10 MG/G
4 GEL TOPICAL 4 TIMES DAILY
Qty: 100 G | Refills: 1 | Status: SHIPPED | OUTPATIENT
Start: 2024-09-12

## 2024-09-12 ASSESSMENT — ENCOUNTER SYMPTOMS
COUGH: 0
PALPITATIONS: 0
SHORTNESS OF BREATH: 0
FEVER: 0
POLYDIPSIA: 0
CHILLS: 0

## 2024-09-12 ASSESSMENT — PAIN SCALES - GENERAL: PAINLEVEL: 4

## 2024-09-12 NOTE — PROGRESS NOTES
"Subjective   Patient ID: Magalys Kay is a 59 y.o. female who presents for Annual Exam (cpe) and Knee Pain (Right pain off and on for past month).    Patient presents today for an annual wellness exam    Medical history and surgical history updated today  Medication list reconciled and updated  Patient denies vision issues at this time: eyeglasses.   Patient denies hearing issues at this time  Current diet: Mostly gluten free, No red meat. Trying to increase vegetarian meals. Counseled.   Current exercise habit: 2x week with sister in law weights. Tennis 2 times a week.   Follows with a dentist: Yes    Patient counseled about available preventative health vaccinations and provided with opportunity to update them with our office or through prescription to preferred pharmacy.    Reviewed and discussed preventative health screening recommendations for colon cancer: due 2033. Completed and normal     Reviewed and discussed preventative health recommendations for screening for cervical cancer and breast cancer: completed 2024- due and ordered for 2025    Following with Derm every 6 months for melanoma skin check.     Knee Pain          Review of Systems   Constitutional:  Negative for chills and fever.   Respiratory:  Negative for cough and shortness of breath.    Cardiovascular:  Negative for chest pain and palpitations.   Endocrine: Negative for polydipsia and polyuria.       Objective   /68 (Patient Position: Sitting)   Pulse 54   Temp 36.7 °C (98 °F) (Temporal)   Ht 1.651 m (5' 5\")   Wt 61.1 kg (134 lb 9.6 oz)   BMI 22.40 kg/m²     Physical Exam  Constitutional:       Appearance: Normal appearance.   HENT:      Head: Normocephalic and atraumatic.      Right Ear: Tympanic membrane normal.      Left Ear: Tympanic membrane normal.      Nose: Nose normal.      Mouth/Throat:      Mouth: Mucous membranes are moist.   Eyes:      Extraocular Movements: Extraocular movements intact.      Conjunctiva/sclera: " Conjunctivae normal.      Pupils: Pupils are equal, round, and reactive to light.   Neck:      Thyroid: No thyroid mass, thyromegaly or thyroid tenderness.      Vascular: No carotid bruit.   Cardiovascular:      Rate and Rhythm: Normal rate and regular rhythm.      Heart sounds: No murmur heard.     No friction rub. No gallop.   Pulmonary:      Effort: Pulmonary effort is normal. No respiratory distress.      Breath sounds: No wheezing, rhonchi or rales.   Abdominal:      General: Bowel sounds are normal.      Palpations: Abdomen is soft.      Tenderness: There is no abdominal tenderness. There is no guarding.      Hernia: No hernia is present.   Musculoskeletal:      Cervical back: Neck supple. No tenderness.      Right lower leg: No edema.      Left lower leg: No edema.   Lymphadenopathy:      Cervical: No cervical adenopathy.   Skin:     Coloration: Skin is not jaundiced.   Neurological:      General: No focal deficit present.      Mental Status: She is alert and oriented to person, place, and time.   Psychiatric:         Mood and Affect: Mood normal.         Assessment/Plan   Problem List Items Addressed This Visit             ICD-10-CM    Gallbladder polyp K82.4     Other Visit Diagnoses         Codes    Annual physical exam    -  Primary Z00.00    Screening mammogram for breast cancer     Z12.31    Relevant Orders    BI mammo bilateral screening tomosynthesis    Screening for cardiovascular condition     Z13.6    Relevant Orders    CT cardiac scoring wo IV contrast    Hemoglobin A1C    Lipid Panel    Screening for diabetes mellitus     Z13.1    Relevant Orders    Hemoglobin A1C    Lipid Panel    It band syndrome, right     M76.31    Relevant Medications    diclofenac sodium (Voltaren) 1 % gel    Rotator cuff arthropathy, right     M12.811    Relevant Orders    Referral to Physical Therapy

## 2024-11-01 ENCOUNTER — HOSPITAL ENCOUNTER (OUTPATIENT)
Dept: RADIOLOGY | Facility: HOSPITAL | Age: 59
Discharge: HOME | End: 2024-11-01
Payer: COMMERCIAL

## 2024-11-01 DIAGNOSIS — Z13.6 SCREENING FOR CARDIOVASCULAR CONDITION: ICD-10-CM

## 2024-11-01 PROCEDURE — 75571 CT HRT W/O DYE W/CA TEST: CPT

## 2024-12-04 ENCOUNTER — EVALUATION (OUTPATIENT)
Dept: PHYSICAL THERAPY | Facility: CLINIC | Age: 59
End: 2024-12-04
Payer: COMMERCIAL

## 2024-12-04 DIAGNOSIS — G89.29 CHRONIC RIGHT SHOULDER PAIN: Primary | ICD-10-CM

## 2024-12-04 DIAGNOSIS — M12.811 ROTATOR CUFF ARTHROPATHY, RIGHT: ICD-10-CM

## 2024-12-04 DIAGNOSIS — M25.511 CHRONIC RIGHT SHOULDER PAIN: Primary | ICD-10-CM

## 2024-12-04 PROCEDURE — 97110 THERAPEUTIC EXERCISES: CPT | Mod: GP | Performed by: PHYSICAL THERAPIST

## 2024-12-04 PROCEDURE — 97161 PT EVAL LOW COMPLEX 20 MIN: CPT | Mod: GP | Performed by: PHYSICAL THERAPIST

## 2024-12-04 ASSESSMENT — ENCOUNTER SYMPTOMS
OCCASIONAL FEELINGS OF UNSTEADINESS: 0
DEPRESSION: 0
LOSS OF SENSATION IN FEET: 0

## 2024-12-04 NOTE — PROGRESS NOTES
Physical Therapy    Physical Therapy Evaluation and Treatment      Patient Name: Magalys Kay  MRN: [id number]  Today's Date: 12/4/2024  Time Entry:   Time Calculation  Start Time: 0900  Stop Time: 0945  Time Calculation (min): 45 min  PT Evaluation Time Entry  PT Evaluation (Low) Time Entry: 30  PT Therapeutic Procedures Time Entry  Therapeutic Exercise Time Entry: 15  Insurance: [facility] [employer] [place], no auth  Visit #1    Assessment:  Magalys is a 59 y.o. R hand dominant female presenting with chronic R shoulder pain that began around 1 year ago without incident. Exam shows R scapular malpositioning, some limitation in R shoulder AROM, decreased shoulder/scapular MMT and positive rotator cuff/impingement signs. Pain is present with playing tennis and contributes to interrupted sleep at night. Pt would benefit from skilled PT to address the listed impairments and reduce pain to improve sleeping and participation in tennis.     Plan:  OP PT Plan  Treatment/Interventions: Therapeutic exercises, Manual therapy, Dry needling, Cryotherapy, Hot pack, Education/ Instruction  PT Plan: Skilled PT  PT Frequency: 1 time per week  Duration: 6-8 weeks  Onset Date: 12/01/23  Number of Treatments Authorized: [place]  Rehab Potential: Good  Plan of Care Agreement: Patient      Problem List Items Addressed This Visit             ICD-10-CM    Chronic right shoulder pain - Primary M25.511, G89.29    Relevant Orders    Follow Up In Physical Therapy     Other Visit Diagnoses         Codes    Rotator cuff arthropathy, right     M12.811    Relevant Orders    Follow Up In Physical Therapy            General:  Reason for Referral: R shoulder pain  Referred By: Ramone Kim MD  Preferred Learning Style: verbal, visual, written    Subjective    Magalys c/o chronic R shoulder pain that began around 1 year ago without incident. She notices pain with playing tennis and feels she doesn't have strength with her overhead serve, however feels she is able to  adapt. A couple months ago she started experiencing interrupted sleep if laying on her side. She also has some numbness in R hand when waking up that resolves quickly and is not present during the day. She denies any radicular pain. She has been taking ibuprofen and using tiger balm or heat which helps. She denies any functional limitations but states she is more careful with using her arm. She saw her PCP in Sept and was referred to PT. She has not had any imaging. PMHx: Raynaud's    Pt Goals: “I'd like to figure out what to do for the shoulder not to hurt.”    Precautions:  Precautions  STEADI Fall Risk Score (The score of 4 or more indicates an increased risk of falling): 0    Pain:  R shoulder 2/10 currently, 7/10 at worst, achy in nature     Prior Level of Function:  Independent in ADLs. Plays tennis, does strength training 2x/week.    Objective   Palpation: TTP R supraspinatus tendon and LHBT    Observation: R scapula slightly depressed and downwardly rotated    Shoulder AROM (R/L in degrees):   Flexion- 165 / 160  Abduction- 165 / 160  Extension- 60 / 60  Functional ER reach- T5 / T5  Functional IR reach- T7 / T12    Shoulder MMT (R/L):   Flexion- 5/5; 5/5  Abduction- 4/5; 5/5  ER- 4/5; 5/5  IR- 5/5; 5/5  Serratus anterior- 3+/5; 4/5  Biceps- 5/5; 5/5  Triceps- 5/5; 5/5  Rhomboids- 4-/5; 4+/5  Mid trap- 4-/5; 4/5  Low trap- 3+/5; 3+/5    Special tests:  Impingement cluster:  (-) Escobar/Michelet  (-) Painful arc  (+) Infraspinatus MMT    Rotator cuff tear cluster:  (-) Drop arm   (-) Painful arc  (+) Infraspinatus MMT    (+) Empty Can  (-) Speed's    Outcome Measures:  QuickDASH: 26 = 34.09%    Treatments:  Therapeutic Exercise:   S/L ER 1# x10 R  Prone mid trap raises x10 B  Prone low trap raises x10 B  Serratus wall slides x10  Standing ER, red band, x10 R  Towel IR stretch x10 w/ 10 sec hold R    EDUCATION:  Access Code: [email or web address]  URL: [email or web address]  Date: 12/04/2024  Prepared  by: Ramone Hess    Exercises  - Sidelying Shoulder ER with Towel and Dumbbell  - 1 x daily - 7 x weekly - 1-2 sets - 10-15 reps - 1# weight  - Prone Shoulder Horizontal Abduction with Thumbs Up  - 1 x daily - 7 x weekly - 1-2 sets - 10-15 reps  - Prone Single Arm Shoulder Y  - 1 x daily - 7 x weekly - 1-2 sets - 10-15 reps  - Serratus Activation at Wall  - 1 x daily - 7 x weekly - 1-2 sets - 10-15 reps  - Shoulder External Rotation with Anchored Resistance  - 1 x daily - 7 x weekly - 1-2 sets - 10-15 reps  - Standing Shoulder Internal Rotation Stretch with Towel  - 1 x daily - 7 x weekly - 1-2 sets - 10 reps - 10 seconds hold    Goals:  Active       PT Problem       PT Goal 1       Start:  12/04/24    Expected End:  02/02/25       Increase R functional IR reach to T7 to improve reaching for bra strap.         PT Goal 2       Start:  12/04/24    Expected End:  02/02/25       Increase R shoulder MMT to 5/5 in abduction and ER and R serratus anterior, rhomboids and mid/low trap MMT to at least 4+/5 to improve dynamic scapulohumeral mechanics with reaching, lifting and overhead serve while playing tennis.         PT Goal 3       Start:  12/04/24    Expected End:  02/02/25       Demonstrate progression towards neutral R scapular positioning to improve subacromial space and improve scapulohumeral rhythm.         PT Goal 4       Start:  12/04/24    Expected End:  02/02/25       Pt will report 0-1/10 R shoulder pain to improve sleeping at night and participation in tennis.         PT Goal 5       Start:  12/04/24    Expected End:  02/02/25       Improve QuickDASH by at least 15.91 points (MDIC).         PT Goal 6       Start:  12/04/24    Expected End:  02/02/25       Pt will demonstrate independence with HEP.

## 2024-12-10 ENCOUNTER — TREATMENT (OUTPATIENT)
Dept: PHYSICAL THERAPY | Facility: CLINIC | Age: 59
End: 2024-12-10
Payer: COMMERCIAL

## 2024-12-10 DIAGNOSIS — M12.811 ROTATOR CUFF ARTHROPATHY, RIGHT: ICD-10-CM

## 2024-12-10 DIAGNOSIS — G89.29 CHRONIC RIGHT SHOULDER PAIN: Primary | ICD-10-CM

## 2024-12-10 DIAGNOSIS — M25.511 CHRONIC RIGHT SHOULDER PAIN: Primary | ICD-10-CM

## 2024-12-10 PROCEDURE — 97110 THERAPEUTIC EXERCISES: CPT | Mod: GP,CQ | Performed by: PHYSICAL THERAPY ASSISTANT

## 2024-12-10 NOTE — PROGRESS NOTES
Physical Therapy    Physical Therapy Treatment    Patient Name: Magalys Kay  MRN: [id number]  Today's Date: 12/10/2024  Time Entry:   Time Calculation  Start Time: 1615  Stop Time: 1700  Time Calculation (min): 45 min      Insurance : [facility]  Authorization /Certification / Visits allowed: med nec  Visit 2      Assessment:  Good return demo of HEP. Cues for scap depressors during prone exercises. Challenged by  prone mid trap w/ R UE. Extension stretch loosened her OH serve.     Plan:  See how tennis went. Progress to WB serratus exercises: serratus push in standing or quadruped.     Problem List Items Addressed This Visit             ICD-10-CM    Chronic right shoulder pain - Primary M25.511, G89.29     Other Visit Diagnoses         Codes    Rotator cuff arthropathy, right     M12.811            Subjective    Compliant w/ HEP. Playing tennis w/ her  after PT.     Pain  2/10    Objective   Lack ER especially during simulated OH serve position, can't reach 90/90     Treatments:  Therapeutic Exercise:   S/L ER 1# 2 x10 R  Prone mid trap raises x10 B x 10 R   Prone low trap raises x10 R  Serratus wall slides x10  Standing ER, red band, x10 R  Towel IR stretch x10 w/ 10 sec hold R  Simulated OH serve form x 2 , x 2 after stretch below  Todd extension stretch 10 reps x 3 sec hold     OP EDUCATION:  Decrease UT   Encouraged research on diet, especially protein     Goals:  Active       PT Problem       PT Goal 1       Start:  12/04/24    Expected End:  02/02/25       Increase R functional IR reach to T7 to improve reaching for bra strap.         PT Goal 2       Start:  12/04/24    Expected End:  02/02/25       Increase R shoulder MMT to 5/5 in abduction and ER and R serratus anterior, rhomboids and mid/low trap MMT to at least 4+/5 to improve dynamic scapulohumeral mechanics with reaching, lifting and overhead serve while playing tennis.         PT Goal 3       Start:  12/04/24    Expected End:  02/02/25        Demonstrate progression towards neutral R scapular positioning to improve subacromial space and improve scapulohumeral rhythm.         PT Goal 4       Start:  12/04/24    Expected End:  02/02/25       Pt will report 0-1/10 R shoulder pain to improve sleeping at night and participation in tennis.         PT Goal 5       Start:  12/04/24    Expected End:  02/02/25       Improve QuickDASH by at least 15.91 points (MDIC).         PT Goal 6       Start:  12/04/24    Expected End:  02/02/25       Pt will demonstrate independence with HEP.

## 2024-12-12 ENCOUNTER — APPOINTMENT (OUTPATIENT)
Dept: DERMATOLOGY | Facility: CLINIC | Age: 59
End: 2024-12-12
Payer: COMMERCIAL

## 2024-12-12 DIAGNOSIS — L82.1 SEBORRHEIC KERATOSIS: ICD-10-CM

## 2024-12-12 DIAGNOSIS — Z12.83 SCREENING EXAM FOR SKIN CANCER: ICD-10-CM

## 2024-12-12 DIAGNOSIS — L57.8 ACTINIC SKIN DAMAGE: ICD-10-CM

## 2024-12-12 DIAGNOSIS — Z85.828 PERSONAL HISTORY OF SKIN CANCER: ICD-10-CM

## 2024-12-12 DIAGNOSIS — L81.4 LENTIGO: ICD-10-CM

## 2024-12-12 DIAGNOSIS — D22.9 MULTIPLE BENIGN NEVI: Primary | ICD-10-CM

## 2024-12-12 PROCEDURE — 1036F TOBACCO NON-USER: CPT | Performed by: DERMATOLOGY

## 2024-12-12 PROCEDURE — 99213 OFFICE O/P EST LOW 20 MIN: CPT | Performed by: DERMATOLOGY

## 2024-12-12 ASSESSMENT — DERMATOLOGY PATIENT ASSESSMENT
HAVE YOU HAD OR DO YOU HAVE A STAPH INFECTION: NO
HAVE YOU HAD OR DO YOU HAVE VASCULAR DISEASE: NO
DO YOU HAVE ANY NEW OR CHANGING LESIONS: NO
DO YOU HAVE IRREGULAR MENSTRUAL CYCLES: NO
ARE YOU AN ORGAN TRANSPLANT RECIPIENT: NO
DO YOU USE A TANNING BED: NO
ARE YOU ON BIRTH CONTROL: NO
ARE YOU TRYING TO GET PREGNANT: NO

## 2024-12-12 ASSESSMENT — ITCH NUMERIC RATING SCALE: HOW SEVERE IS YOUR ITCHING?: 0

## 2024-12-12 ASSESSMENT — DERMATOLOGY QUALITY OF LIFE (QOL) ASSESSMENT
RATE HOW BOTHERED YOU ARE BY EFFECTS OF YOUR SKIN PROBLEMS ON YOUR ACTIVITIES (EG, GOING OUT, ACCOMPLISHING WHAT YOU WANT, WORK ACTIVITIES OR YOUR RELATIONSHIPS WITH OTHERS): 0 - NEVER BOTHERED
RATE HOW BOTHERED YOU ARE BY SYMPTOMS OF YOUR SKIN PROBLEM (EG, ITCHING, STINGING BURNING, HURTING OR SKIN IRRITATION): 0 - NEVER BOTHERED
ARE THERE EXCLUSIONS OR EXCEPTIONS FOR THE QUALITY OF LIFE ASSESSMENT: NO
RATE HOW EMOTIONALLY BOTHERED YOU ARE BY YOUR SKIN PROBLEM (FOR EXAMPLE, WORRY, EMBARRASSMENT, FRUSTRATION): 0 - NEVER BOTHERED
DATE THE QUALITY-OF-LIFE ASSESSMENT WAS COMPLETED: 67186

## 2024-12-12 ASSESSMENT — PATIENT GLOBAL ASSESSMENT (PGA): PATIENT GLOBAL ASSESSMENT: PATIENT GLOBAL ASSESSMENT:  1 - CLEAR

## 2024-12-12 NOTE — PROGRESS NOTES
Subjective     Magalys Kay is a 59 y.o. female who presents for the following: Skin Check (Hx of bcc; father had melanoma). Last derm visit 6/18/24 for Full Skin Exam     Review of Systems:  No other skin or systemic complaints other than what is documented elsewhere in the note.    The following portions of the chart were reviewed this encounter and updated as appropriate:  Tobacco  Allergies  Meds  Problems  Med Hx  Surg Hx         Skin Cancer History  Biopsy Date Type Location Status   6/18/24 BCC Left Clavicle Treatment Complete  8/16/24       Specialty Problems          Dermatology Problems    Family history of melanoma     Family hx of esophageal melanoma diagnosed at age 61 in her father and brother had amelanoma excised at 45          History of nonmelanoma skin cancer     Discussed niacinamide as prophy    Lesion 1: BCC.Year Diagnosed: 2014. August.Location:  Right Lower Leg.  medial aspectTreatment(s):  Electrodessication and curretage (not here)    Lesion 2: Superficial Basal Cell Carcinoma.  Deep margins involved.Year Diagnosed:  2020. June.Location:  Right Posterior Upper Arm.Treatment(s): ED&C.  Treated by: Leeanne Madsen MD 8/27/2020Pathology: [id number]    Lesion 3: Superficial Basal Cell Carcinoma.  Deep margins involved.Year Diagnosed:  2021. June.Location:  Left Upper Arm.Treatment(s): Treated by: Leeanne Madsen MD 7/28/2021 ED&CPathology: [id number]    Lesion 4: Nodular Basal Cell Carcinoma.  Deep margins involved.Year Diagnosed:  2022. December.Location:  Left [place].Treatment(s): mohsSurgeon: Rudolph2/1/23Pathology: [id number]          Multiple dysplastic nevi     Hx of multiple dyplastic nevi, acral compound nevi with pagetoid extension s/p re-excision onright dorsal foot in 2015              Objective   Well appearing patient in no apparent distress; mood and affect are within normal limits.    A full examination was performed including scalp, head, eyes, ears, nose, lips, neck,  chest, axillae, abdomen, back, buttocks, bilateral upper extremities, bilateral lower extremities, hands, feet, fingers, toes, fingernails, and toenails. All findings within normal limits unless otherwise noted below.    Assessment/Plan   1. Multiple benign nevi  Brown and tan macules and papules with reassuring findings on dermoscopy    -These lesions have benign, reassuring patterns on dermoscopy  -Recommend continued self observation, and to contact the office if any changes in nevi are noticed    2. Screening exam for skin cancer      Full body skin exam  -No lesions concerning for malignancy on the remainder the skin exam today   - The ugly duckling sign was discussed. Monitor for any skin lesions that are different in color, shape, or size than others on body  -Sun protection was discussed. Recommend SPF 30+, hats with brims, sun protective clothing, and avoiding sun exposure between 10 AM and 2 PM whenever possible  -Recommend regular skin exams or sooner if new or changing lesions       Related Procedures  Follow Up In Dermatology - Established Patient  Follow Up In Dermatology - Established Patient    3. Lentigo (2)  Generalized, Left Preauricular Area  Tan macules    One broader patch anterior to left ear. Appears to be the same as compared to photo 12/2022. Re-take photo today          -Benign appearing on exam  -Reassurance, recommend observation    4. Seborrheic keratosis  Stuck on, waxy macule(s)/papule(s)/plaque(s) with comedo-like openings and milia like cysts    -Discussed the nature of the diagnosis  -Reassurance, recommend continued observation    5. Actinic skin damage  Background of photodamage with hyper- and hypo-pigmented macules on the skin    6. Personal history of skin cancer    Personal History of Non-Melanoma Skin Cancer  -Well healed scar(s) with no evidence of recurrence  -Discussed the need for annual or semi-annual skin examinations and to return sooner if any new or changing lesions  are noticed. Patient verbalizes understanding        Follow up 6 months Full Skin Exam

## 2024-12-17 ENCOUNTER — TREATMENT (OUTPATIENT)
Dept: PHYSICAL THERAPY | Facility: CLINIC | Age: 59
End: 2024-12-17
Payer: COMMERCIAL

## 2024-12-17 DIAGNOSIS — M12.811 ROTATOR CUFF ARTHROPATHY, RIGHT: ICD-10-CM

## 2024-12-17 DIAGNOSIS — G89.29 CHRONIC RIGHT SHOULDER PAIN: ICD-10-CM

## 2024-12-17 DIAGNOSIS — M25.511 CHRONIC RIGHT SHOULDER PAIN: ICD-10-CM

## 2024-12-17 PROCEDURE — 97110 THERAPEUTIC EXERCISES: CPT | Mod: GP,CQ | Performed by: PHYSICAL THERAPY ASSISTANT

## 2024-12-17 NOTE — PROGRESS NOTES
Physical Therapy    Physical Therapy Treatment    Patient Name: Magalys Kay  MRN: [id number]  Today's Date: 12/17/2024  Time Entry:   Time Calculation  Start Time: 1445  Stop Time: 1530  Time Calculation (min): 45 min     PT Therapeutic Procedures Time Entry  Therapeutic Exercise Time Entry: 40    Insurance : [facility]  Authorization /Certification / Visits allowed: med nec  Visit 3      Assessment:  Challenged by serratus in quadruped (arching LB vs protracting scap) but able to recruit w/ wall lean serratus push. Sweep pecs stretch demos lower fiber tightness . Did well with rotational pecs stretch showing improved sweep stretch and better OH mobility .      Plan:  Begin w sweep , pecs rotational, and extension stretches.   Posterior capsule mob , as needed   Switch to supine serratus punch if wall lean serratus push remains difficult.        Problem List Items Addressed This Visit             ICD-10-CM    Chronic right shoulder pain M25.511, G89.29     Other Visit Diagnoses         Codes    Rotator cuff arthropathy, right     M12.811            Subjective    Compliant w/ HEP. Sleep is better but her hand is asleep when getting OOB.    Pain  2/10    Objective   Distal pecs tight noted w/ sweep stretching      Treatments:  Therapeutic Exercise:   S/L ER 1# 3 x10 R // Prone rows #1 2 x 10 R  Prone mid trap raises 2 x 10 R // Prone low trap raises x10 R  Wall lean serratus push x 10 ; quadruped too difficult   Serratus wall slides x10  Standing ER, red band, x10 R--not today   Towel IR stretch x10 w/ 10 sec hold R--not today   [place] sweep before /after rotational stretch x 5   Rotational pecs stretch 4 positions ( 0-10-20-30 deg abd) x 2-3 rotating away w/ 3 sec hold   Todd extension stretch 10 reps x 3 sec hold     OP EDUCATION:  Use of sweeps and pecs dynamic stretch for tennis warm-ups     Goals:  Active       PT Problem       PT Goal 1       Start:  12/04/24    Expected End:  02/02/25       Increase R functional  IR reach to T7 to improve reaching for bra strap.         PT Goal 2       Start:  12/04/24    Expected End:  02/02/25       Increase R shoulder MMT to 5/5 in abduction and ER and R serratus anterior, rhomboids and mid/low trap MMT to at least 4+/5 to improve dynamic scapulohumeral mechanics with reaching, lifting and overhead serve while playing tennis.         PT Goal 3       Start:  12/04/24    Expected End:  02/02/25       Demonstrate progression towards neutral R scapular positioning to improve subacromial space and improve scapulohumeral rhythm.         PT Goal 4       Start:  12/04/24    Expected End:  02/02/25       Pt will report 0-1/10 R shoulder pain to improve sleeping at night and participation in tennis.         PT Goal 5       Start:  12/04/24    Expected End:  02/02/25       Improve QuickDASH by at least 15.91 points (MDIC).         PT Goal 6       Start:  12/04/24    Expected End:  02/02/25       Pt will demonstrate independence with HEP.

## 2024-12-26 ENCOUNTER — TREATMENT (OUTPATIENT)
Dept: PHYSICAL THERAPY | Facility: CLINIC | Age: 59
End: 2024-12-26
Payer: COMMERCIAL

## 2024-12-26 DIAGNOSIS — M25.511 CHRONIC RIGHT SHOULDER PAIN: Primary | ICD-10-CM

## 2024-12-26 DIAGNOSIS — M12.811 ROTATOR CUFF ARTHROPATHY, RIGHT: ICD-10-CM

## 2024-12-26 DIAGNOSIS — G89.29 CHRONIC RIGHT SHOULDER PAIN: Primary | ICD-10-CM

## 2024-12-26 PROCEDURE — 97110 THERAPEUTIC EXERCISES: CPT | Mod: GP,CQ | Performed by: PHYSICAL THERAPY ASSISTANT

## 2024-12-26 NOTE — PROGRESS NOTES
Physical Therapy    Physical Therapy Treatment    Patient Name: Magalys Kay  MRN: [id number]  Today's Date: 12/26/2024  Time Entry:   Time Calculation  Start Time: 1445  Stop Time: 1530  Time Calculation (min): 45 min     PT Therapeutic Procedures Time Entry  Therapeutic Exercise Time Entry: 40    Insurance : [facility]  Authorization /Certification / Visits allowed: med nec  Visit 4      Assessment:  Responded well to ice massage to pecs tendon area ice massage and able to perform strengthening program w/o c/o.      Plan:  Resume sweep , pecs rotational, and extension stretches.   Posterior capsule mob , as needed     Problem List Items Addressed This Visit             ICD-10-CM    Chronic right shoulder pain - Primary M25.511, G89.29     Other Visit Diagnoses         Codes    Rotator cuff arthropathy, right     M12.811            Subjective    Semi-compliant w/ HEP d/t holidays w/ family. R shoulder is sore d/t lifting grandkids.   Pain  5/10 VAS     Objective   Pecs TTP + pecs tendon       Treatments:  Therapeutic Exercise:   [place] sweep before /after rotational stretch x 5   Rotational pecs stretch 4 positions ( 0-10-20-30 deg abd) x 2-3 rotating away w/ 3 sec hold --pain at ~40 deg   S/L ER 1# 3 x10 R // Prone rows #1 2 x 10 R  Prone mid trap raises 2 x 10 R // Prone low trap raises x10 R  Serratus punch #3 2 x 10   Y's up the wall with lift off x 10 w/ 3 sec hold     Not today :   Standing ER, red band, x10 R--not today   Towel IR stretch x10 w/ 10 sec hold R--not today         Todd extension stretch 10 reps x 3 sec hold     OP EDUCATION:  Use of sweeps and pecs dynamic stretch for tennis warm-ups     Goals:  Active       PT Problem       PT Goal 1       Start:  12/04/24    Expected End:  02/02/25       Increase R functional IR reach to T7 to improve reaching for bra strap.         PT Goal 2       Start:  12/04/24    Expected End:  02/02/25       Increase R shoulder MMT to 5/5 in abduction and ER and R serratus  anterior, rhomboids and mid/low trap MMT to at least 4+/5 to improve dynamic scapulohumeral mechanics with reaching, lifting and overhead serve while playing tennis.         PT Goal 3       Start:  12/04/24    Expected End:  02/02/25       Demonstrate progression towards neutral R scapular positioning to improve subacromial space and improve scapulohumeral rhythm.         PT Goal 4       Start:  12/04/24    Expected End:  02/02/25       Pt will report 0-1/10 R shoulder pain to improve sleeping at night and participation in tennis.         PT Goal 5       Start:  12/04/24    Expected End:  02/02/25       Improve QuickDASH by at least 15.91 points (MDIC).         PT Goal 6       Start:  12/04/24    Expected End:  02/02/25       Pt will demonstrate independence with HEP.

## 2025-01-02 ENCOUNTER — TREATMENT (OUTPATIENT)
Dept: PHYSICAL THERAPY | Facility: CLINIC | Age: 60
End: 2025-01-02
Payer: COMMERCIAL

## 2025-01-02 DIAGNOSIS — M12.811 ROTATOR CUFF ARTHROPATHY, RIGHT: ICD-10-CM

## 2025-01-02 DIAGNOSIS — G89.29 CHRONIC RIGHT SHOULDER PAIN: Primary | ICD-10-CM

## 2025-01-02 DIAGNOSIS — M25.511 CHRONIC RIGHT SHOULDER PAIN: Primary | ICD-10-CM

## 2025-01-02 PROCEDURE — 97140 MANUAL THERAPY 1/> REGIONS: CPT | Mod: GP,CQ | Performed by: PHYSICAL THERAPY ASSISTANT

## 2025-01-02 PROCEDURE — 97110 THERAPEUTIC EXERCISES: CPT | Mod: GP,CQ | Performed by: PHYSICAL THERAPY ASSISTANT

## 2025-01-02 NOTE — PROGRESS NOTES
Physical Therapy    Physical Therapy Treatment    Patient Name: Magalys Kay  MRN: [id number]  Today's Date: 1/2/2025  Time Entry:   Time Calculation  Start Time: 1400  Stop Time: 1445  Time Calculation (min): 45 min     PT Therapeutic Procedures Time Entry  Manual Therapy Time Entry: 10  Therapeutic Exercise Time Entry: 30    Insurance : [facility]  Authorization /Certification / Visits allowed: med nec  Visit 5      Assessment:  Able to resume prior program w/o c/o. Improved AROM shoulder flexion after posterior capsule mob .     Plan:  Check FIR. Progress ER to 90/90 , serratus supine w/ band -->standing upper cut, wtd. Swings to simulate tennis .   Continue sweep , pecs rotational, and extension stretches.   Posterior capsule mob issue for HEP.     Problem List Items Addressed This Visit             ICD-10-CM    Chronic right shoulder pain - Primary M25.511, G89.29     Other Visit Diagnoses         Codes    Rotator cuff arthropathy, right     M12.811            Subjective    Did well with her trip to [place] w/o shoulder c/o.   Pain  none    Objective   Slight trunk rotation w/ sweep stretch @ OH to behind       Treatments:  Manual :   Posterior mob  Supine sweep pecs and biceps MWM     Therapeutic Exercise:   Posterior capsule self mob 3 reps x 5 sec hold   [place] sweep before /after rotational stretch x 5   Rotational pecs stretch 6 positions ( 0-10-20-30-40-50 deg abd) x 2-3 rotating away w/ 3 sec hold  Todd extension stretch 10 reps x 3 sec hold   S/L ER 1# 3 x10 R // Prone rows #3 2 x 10 R  Prone mid trap raises 1# 2 x 10 R // Prone 1# low trap raises 2 x10 R  Serratus punch #4 2 x 10   Y's up the wall with lift off x 10 w/ 3 sec hold R    Not today :   Standing ER, red band, x10 R--not today   Towel IR stretch x10 w/ 10 sec hold R--not today         OP EDUCATION:  Use of sweeps and pecs dynamic stretch for tennis warm-ups     Goals:  Active       PT Problem       PT Goal 1       Start:  12/04/24    Expected End:   02/02/25       Increase R functional IR reach to T7 to improve reaching for bra strap.         PT Goal 2       Start:  12/04/24    Expected End:  02/02/25       Increase R shoulder MMT to 5/5 in abduction and ER and R serratus anterior, rhomboids and mid/low trap MMT to at least 4+/5 to improve dynamic scapulohumeral mechanics with reaching, lifting and overhead serve while playing tennis.         PT Goal 3       Start:  12/04/24    Expected End:  02/02/25       Demonstrate progression towards neutral R scapular positioning to improve subacromial space and improve scapulohumeral rhythm.         PT Goal 4       Start:  12/04/24    Expected End:  02/02/25       Pt will report 0-1/10 R shoulder pain to improve sleeping at night and participation in tennis.         PT Goal 5       Start:  12/04/24    Expected End:  02/02/25       Improve QuickDASH by at least 15.91 points (MDIC).         PT Goal 6       Start:  12/04/24    Expected End:  02/02/25       Pt will demonstrate independence with HEP.

## 2025-01-08 ENCOUNTER — HOSPITAL ENCOUNTER (OUTPATIENT)
Dept: RADIOLOGY | Facility: CLINIC | Age: 60
Discharge: HOME | End: 2025-01-08
Payer: COMMERCIAL

## 2025-01-08 VITALS — BODY MASS INDEX: 22.33 KG/M2 | WEIGHT: 134 LBS | HEIGHT: 65 IN

## 2025-01-08 DIAGNOSIS — Z12.31 SCREENING MAMMOGRAM FOR BREAST CANCER: ICD-10-CM

## 2025-01-08 PROCEDURE — 77063 BREAST TOMOSYNTHESIS BI: CPT | Performed by: RADIOLOGY

## 2025-01-08 PROCEDURE — 77067 SCR MAMMO BI INCL CAD: CPT

## 2025-01-08 PROCEDURE — 77067 SCR MAMMO BI INCL CAD: CPT | Performed by: RADIOLOGY

## 2025-01-09 ENCOUNTER — TREATMENT (OUTPATIENT)
Dept: PHYSICAL THERAPY | Facility: CLINIC | Age: 60
End: 2025-01-09
Payer: COMMERCIAL

## 2025-01-09 DIAGNOSIS — G89.29 CHRONIC RIGHT SHOULDER PAIN: Primary | ICD-10-CM

## 2025-01-09 DIAGNOSIS — M12.811 ROTATOR CUFF ARTHROPATHY, RIGHT: ICD-10-CM

## 2025-01-09 DIAGNOSIS — M25.511 CHRONIC RIGHT SHOULDER PAIN: Primary | ICD-10-CM

## 2025-01-09 PROCEDURE — 97110 THERAPEUTIC EXERCISES: CPT | Mod: GP,CQ | Performed by: PHYSICAL THERAPY ASSISTANT

## 2025-01-09 NOTE — PROGRESS NOTES
Physical Therapy    Physical Therapy Treatment    Patient Name: Magalys Kay  MRN: [id number]  Today's Date: 1/9/2025  Time Entry:   Time Calculation  Start Time: 1400  Stop Time: 1450  Time Calculation (min): 50 min     PT Therapeutic Procedures Time Entry  Therapeutic Exercise Time Entry: 45    Insurance : [facility]  Authorization /Certification / Visits allowed: med nec  Visit 6      Assessment:  Appropriately challenge w/ swimmer's exercise. Pecs rotational stretch at higher angles revealed more tight areas .     Plan:  Discharge next visit per pt request d/t leaving for [facility].     Problem List Items Addressed This Visit             ICD-10-CM    Chronic right shoulder pain - Primary M25.511, G89.29     Other Visit Diagnoses         Codes    Rotator cuff arthropathy, right     M12.811            Subjective    Resumed playing tennis w/ her  w/o incident. Compliant w/ HEP.   Pain  none    Objective   Slight trunk rotation w/ sweep stretch @ OH to behind       Treatments:  Manual :   Supine sweep pecs and biceps MWM x 5 min     Therapeutic Exercise:   Posterior capsule self mob 3 reps x 5 sec hold   [place] sweep before /after rotational stretch x 5   Rotational pecs stretch 6 positions ( 0-10-20 ; 46--110 deg abd) x 2-3 rotating away w/ 3 sec hold  Todd extension stretch 10 reps x 3 sec hold   Prone swimmer's :   1. Retraction 90/90 (fingers point to floor)   2. Rotate to neutral (forearm parallel to floor) 2 x 10 each   3. Extend to full flexion x 5 reps    4. Catch/release ball x 5 reps  Upper cut w/ UE in neutral #5 x 10 reps   S/L ER 1# 3 x10 R // Prone rows #5 2 x 10 R  Prone mid trap raises 1# 2 x 10 R // Prone 1# low trap raises 2 x10 R  Serratus punch #4 2 x 10   Y's up the wall with lift off x 10 w/ 3 sec hold R--not today     Not today :   Standing ER, red band, x10 R--not today   Towel IR stretch x10 w/ 10 sec hold R--not today         OP EDUCATION:  Use of sweeps and pecs dynamic stretch for  tennis warm-ups     Goals:  Active       PT Problem       PT Goal 1       Start:  12/04/24    Expected End:  02/02/25       Increase R functional IR reach to T7 to improve reaching for bra strap.         PT Goal 2       Start:  12/04/24    Expected End:  02/02/25       Increase R shoulder MMT to 5/5 in abduction and ER and R serratus anterior, rhomboids and mid/low trap MMT to at least 4+/5 to improve dynamic scapulohumeral mechanics with reaching, lifting and overhead serve while playing tennis.         PT Goal 3       Start:  12/04/24    Expected End:  02/02/25       Demonstrate progression towards neutral R scapular positioning to improve subacromial space and improve scapulohumeral rhythm.         PT Goal 4       Start:  12/04/24    Expected End:  02/02/25       Pt will report 0-1/10 R shoulder pain to improve sleeping at night and participation in tennis.         PT Goal 5       Start:  12/04/24    Expected End:  02/02/25       Improve QuickDASH by at least 15.91 points (MDIC).         PT Goal 6       Start:  12/04/24    Expected End:  02/02/25       Pt will demonstrate independence with HEP.

## 2025-01-16 ENCOUNTER — TREATMENT (OUTPATIENT)
Dept: PHYSICAL THERAPY | Facility: CLINIC | Age: 60
End: 2025-01-16
Payer: COMMERCIAL

## 2025-01-16 DIAGNOSIS — M25.511 CHRONIC RIGHT SHOULDER PAIN: Primary | ICD-10-CM

## 2025-01-16 DIAGNOSIS — G89.29 CHRONIC RIGHT SHOULDER PAIN: Primary | ICD-10-CM

## 2025-01-16 DIAGNOSIS — M12.811 ROTATOR CUFF ARTHROPATHY, RIGHT: ICD-10-CM

## 2025-01-16 PROCEDURE — 97110 THERAPEUTIC EXERCISES: CPT | Mod: GP | Performed by: PHYSICAL THERAPIST

## 2025-01-16 NOTE — PROGRESS NOTES
"Physical Therapy    Physical Therapy Treatment    Patient Name: Magalys Kay  MRN: [id number]  Today's Date: 1/9/2025  Time Entry:   Time Calculation  Start Time: 1400  Stop Time: 1440  Time Calculation (min): 40 min     PT Therapeutic Procedures Time Entry  Therapeutic Exercise Time Entry: 30    Insurance: [facility] [employer] [place], no auth  Visit #7      Assessment:  Reassess shows improved shoulder AROM and strength since beginning PT. Quick DASH score improved by 7 points. Magalys has shown progress towards all PT goals and is independent with her home exercises.     Plan:  Pt leaving for [place] for the next 2 1/2 months. Discharge to [facility].    Problem List Items Addressed This Visit             ICD-10-CM    Chronic right shoulder pain - Primary M25.511, G89.29     Other Visit Diagnoses         Codes    Rotator cuff arthropathy, right     M12.811            Subjective    \"Shoulder has been a lot better. Feels 80% improved.\"    Pain  R shoulder 0/10    Objective   Shoulder AROM (R/L in degrees):   Flexion- 165 / 160  Abduction- 165 / 160  Extension- 60 / 60  Functional ER reach- T5 / T5  Functional IR reach- T7 / T11    MMT (R/L):   Flexion- 5/5; 5/5  Abduction- 4+/5; 5/5  ER- 4+/5; 5/5  IR- 5/5; 5/5  Serratus anterior- 4-/5; 4/5    Outcome Measures:  QuickDASH: 19 = 18.18%    Treatments:  Therapeutic Exercise:   UBE 2/2  Prone mid and low trap raises 1# 2x10 R  Serratus lifts, yellow band, 2x10  ER walkouts, red band, x10 w/ 10 sec hold  Towel IR stretch x10 w/ 10 sec hold R   Standing low trap lifts at door (Y position) 2x10 w/ 3 sec hold B  Cable cross D2 extension 5# 2x10 R    OP EDUCATION:   Access Code: [email or web address]  URL: [email or web address]  Date: 01/16/2025  Prepared by: Ramone Hess    Exercises  - Shoulder Flexion Serratus Activation with Resistance  - 1 x daily - 7 x weekly - 2-3 sets - 10 reps  - Shoulder External Rotation Reactive Isometrics  - 1 x daily - 7 x weekly - 1-2 " sets - 10 reps - 10 seconds hold    Goals:  Active       PT Problem       PT Goal 1       Start:  12/04/24    Expected End:  02/02/25       Increase R functional IR reach to T7 to improve reaching for bra strap.         PT Goal 2       Start:  12/04/24    Expected End:  02/02/25       Increase R shoulder MMT to 5/5 in abduction and ER and R serratus anterior, rhomboids and mid/low trap MMT to at least 4+/5 to improve dynamic scapulohumeral mechanics with reaching, lifting and overhead serve while playing tennis.         PT Goal 3       Start:  12/04/24    Expected End:  02/02/25       Demonstrate progression towards neutral R scapular positioning to improve subacromial space and improve scapulohumeral rhythm.         PT Goal 4       Start:  12/04/24    Expected End:  02/02/25       Pt will report 0-1/10 R shoulder pain to improve sleeping at night and participation in tennis.         PT Goal 5       Start:  12/04/24    Expected End:  02/02/25       Improve QuickDASH by at least 15.91 points (MDIC).         PT Goal 6       Start:  12/04/24    Expected End:  02/02/25       Pt will demonstrate independence with HEP.

## 2025-04-15 ENCOUNTER — OFFICE VISIT (OUTPATIENT)
Dept: PRIMARY CARE | Facility: CLINIC | Age: 60
End: 2025-04-15
Payer: COMMERCIAL

## 2025-04-15 VITALS
TEMPERATURE: 96.8 F | HEART RATE: 63 BPM | SYSTOLIC BLOOD PRESSURE: 124 MMHG | DIASTOLIC BLOOD PRESSURE: 66 MMHG | WEIGHT: 134 LBS | BODY MASS INDEX: 22.3 KG/M2

## 2025-04-15 DIAGNOSIS — J32.0 LEFT MAXILLARY SINUSITIS: Primary | ICD-10-CM

## 2025-04-15 PROCEDURE — 99213 OFFICE O/P EST LOW 20 MIN: CPT | Performed by: INTERNAL MEDICINE

## 2025-04-15 PROCEDURE — 1036F TOBACCO NON-USER: CPT | Performed by: INTERNAL MEDICINE

## 2025-04-15 RX ORDER — AMOXICILLIN AND CLAVULANATE POTASSIUM 875; 125 MG/1; MG/1
875 TABLET, FILM COATED ORAL 2 TIMES DAILY
Qty: 20 TABLET | Refills: 0 | Status: SHIPPED | OUTPATIENT
Start: 2025-04-15 | End: 2025-04-25

## 2025-04-15 RX ORDER — IPRATROPIUM BROMIDE 21 UG/1
2 SPRAY, METERED NASAL EVERY 12 HOURS
Qty: 30 ML | Refills: 0 | Status: SHIPPED | OUTPATIENT
Start: 2025-04-15 | End: 2026-04-15

## 2025-04-15 ASSESSMENT — ENCOUNTER SYMPTOMS
CHILLS: 0
SINUS COMPLAINT: 1
POLYDIPSIA: 0
SORE THROAT: 0
FATIGUE: 1
COUGH: 0
SHORTNESS OF BREATH: 0
FEVER: 0
PALPITATIONS: 0
SINUS PAIN: 1

## 2025-04-15 ASSESSMENT — PAIN SCALES - GENERAL: PAINLEVEL_OUTOF10: 2

## 2025-04-15 NOTE — PROGRESS NOTES
Subjective   Patient ID: Magalys Kay is a 60 y.o. female who presents for Sinus Problem and Nasal Congestion.    Sinus Problem  Associated symptoms include ear pain (left). Pertinent negatives include no chills, coughing, shortness of breath or sore throat.        Review of Systems   Constitutional:  Positive for fatigue. Negative for chills and fever.   HENT:  Positive for ear pain (left) and sinus pain. Negative for sore throat.    Respiratory:  Negative for cough and shortness of breath.    Cardiovascular:  Negative for chest pain and palpitations.   Endocrine: Negative for polydipsia and polyuria.       Objective   /66 (BP Location: Right arm, Patient Position: Sitting, BP Cuff Size: Adult)   Pulse 63   Temp 36 °C (96.8 °F) (Temporal)   Wt 60.8 kg (134 lb)   BMI 22.30 kg/m²     Physical Exam  HENT:      Head: Normocephalic and atraumatic.      Right Ear: Tympanic membrane and ear canal normal. There is no impacted cerumen.      Left Ear: Tympanic membrane and ear canal normal. There is no impacted cerumen.      Nose: Congestion and rhinorrhea present.      Comments: TTP over maxillary sinus on left side     Mouth/Throat:      Mouth: Mucous membranes are moist.      Pharynx: Oropharynx is clear.   Eyes:      Extraocular Movements: Extraocular movements intact.      Pupils: Pupils are equal, round, and reactive to light.   Cardiovascular:      Rate and Rhythm: Normal rate.   Pulmonary:      Effort: No respiratory distress.      Breath sounds: No wheezing, rhonchi or rales.   Musculoskeletal:      Cervical back: Neck supple. No tenderness.   Lymphadenopathy:      Cervical: No cervical adenopathy.         Assessment/Plan   Assessment & Plan  Left maxillary sinusitis    Orders:    amoxicillin-pot clavulanate (Augmentin) 875-125 mg tablet; Take 1 tablet (875 mg) by mouth 2 times a day for 10 days.    ipratropium (Atrovent) 21 mcg (0.03 %) nasal spray; Administer 2 sprays into each nostril every 12  hours.

## 2025-06-10 ENCOUNTER — APPOINTMENT (OUTPATIENT)
Dept: DERMATOLOGY | Facility: CLINIC | Age: 60
End: 2025-06-10
Payer: COMMERCIAL

## 2025-06-10 DIAGNOSIS — Z85.828 PERSONAL HISTORY OF SKIN CANCER: ICD-10-CM

## 2025-06-10 DIAGNOSIS — D22.9 MULTIPLE BENIGN NEVI: ICD-10-CM

## 2025-06-10 DIAGNOSIS — L57.0 ACTINIC KERATOSIS: Primary | ICD-10-CM

## 2025-06-10 DIAGNOSIS — L82.1 SEBORRHEIC KERATOSIS: ICD-10-CM

## 2025-06-10 DIAGNOSIS — Z12.83 SCREENING EXAM FOR SKIN CANCER: ICD-10-CM

## 2025-06-10 DIAGNOSIS — L57.8 ACTINIC SKIN DAMAGE: ICD-10-CM

## 2025-06-10 DIAGNOSIS — L81.4 LENTIGO: ICD-10-CM

## 2025-06-10 PROCEDURE — 99213 OFFICE O/P EST LOW 20 MIN: CPT | Performed by: DERMATOLOGY

## 2025-06-10 PROCEDURE — 1036F TOBACCO NON-USER: CPT | Performed by: DERMATOLOGY

## 2025-06-10 PROCEDURE — 17000 DESTRUCT PREMALG LESION: CPT | Performed by: DERMATOLOGY

## 2025-06-10 ASSESSMENT — DERMATOLOGY PATIENT ASSESSMENT
ARE YOU TRYING TO GET PREGNANT: NO
DO YOU USE SUNSCREEN: OCCASIONALLY
HAVE YOU HAD OR DO YOU HAVE A STAPH INFECTION: NO
ARE YOU ON BIRTH CONTROL: NO
DO YOU HAVE IRREGULAR MENSTRUAL CYCLES: NO
DO YOU USE A TANNING BED: NO
DO YOU HAVE ANY NEW OR CHANGING LESIONS: NO
ARE YOU AN ORGAN TRANSPLANT RECIPIENT: NO
HAVE YOU HAD OR DO YOU HAVE VASCULAR DISEASE: NO

## 2025-06-10 ASSESSMENT — PATIENT GLOBAL ASSESSMENT (PGA): PATIENT GLOBAL ASSESSMENT: PATIENT GLOBAL ASSESSMENT:  1 - CLEAR

## 2025-06-10 ASSESSMENT — DERMATOLOGY QUALITY OF LIFE (QOL) ASSESSMENT
RATE HOW BOTHERED YOU ARE BY EFFECTS OF YOUR SKIN PROBLEMS ON YOUR ACTIVITIES (EG, GOING OUT, ACCOMPLISHING WHAT YOU WANT, WORK ACTIVITIES OR YOUR RELATIONSHIPS WITH OTHERS): 0 - NEVER BOTHERED
RATE HOW BOTHERED YOU ARE BY SYMPTOMS OF YOUR SKIN PROBLEM (EG, ITCHING, STINGING BURNING, HURTING OR SKIN IRRITATION): 0 - NEVER BOTHERED
ARE THERE EXCLUSIONS OR EXCEPTIONS FOR THE QUALITY OF LIFE ASSESSMENT: NO
RATE HOW EMOTIONALLY BOTHERED YOU ARE BY YOUR SKIN PROBLEM (FOR EXAMPLE, WORRY, EMBARRASSMENT, FRUSTRATION): 0 - NEVER BOTHERED
DATE THE QUALITY-OF-LIFE ASSESSMENT WAS COMPLETED: 67366

## 2025-06-10 ASSESSMENT — ITCH NUMERIC RATING SCALE: HOW SEVERE IS YOUR ITCHING?: 0

## 2025-06-10 NOTE — PROGRESS NOTES
Subjective     Magalys Kay is a 60 y.o. female who presents for the following: Skin Check. Last derm visit 12/12/24 for Full Skin Exam. History of basal cell carcinoma. History of dysplastic nevus. FH melanoma (father).     Intake Questions  Do you have any new or changing Lesions?: No  Are you an organ transplant recipient?: No  Have you had or do you have a Staph Infection?: No  Have you had or do you have Vacular Disease?: No  Do you use sunscreen?: Occasionally  Do you use a tanning bed?: No  Are you trying to get pregnant?: No  Are you on birth control?: No  Do you have irregular menstrual cycles?: No    Review of Systems:  No other skin or systemic complaints other than what is documented elsewhere in the note.    The following portions of the chart were reviewed this encounter and updated as appropriate:  Tobacco  Allergies  Meds  Problems  Med Hx  Surg Hx         Skin Cancer History  Biopsy Log Book  Biopsied Type Location Status   12/19/23 Other Benign Neoplasm Left Buccal Cheek Patient/Caregiver Informed  8/16/24 6/18/24 BCC Left Clavicle Treatment Complete  8/16/24       Additional History      Specialty Problems          Dermatology Problems    Family history of melanoma    Family hx of esophageal melanoma diagnosed at age 60 in her father and brother had amelanoma excised at 45          History of nonmelanoma skin cancer    Discussed niacinamide as prophy    Lesion 1: BCC.Year Diagnosed: 2014. August.Location:  Right Lower Leg.  medial aspectTreatment(s):  Electrodessication and curretage (not here)    Lesion 2: Superficial Basal Cell Carcinoma.  Deep margins involved.Year Diagnosed:  2020. June.Location:  Right Posterior Upper Arm.Treatment(s): ED&C.  Treated by: Leeanne Madsen MD 8/27/2020Pathology: [id number]    Lesion 3: Superficial Basal Cell Carcinoma.  Deep margins involved.Year Diagnosed:  2021. June.Location:  Left Upper Arm.Treatment(s): Treated by: Leeanne Masden MD 7/28/2021  ED&CPathology: [id number]    Lesion 4: Nodular Basal Cell Carcinoma.  Deep margins involved.Year Diagnosed:  2022. December.Location:  Left [place].Treatment(s): mohsSurgeon: Rudolph2/1/23Pathology: [id number]          Multiple dysplastic nevi    Hx of multiple dyplastic nevi, acral compound nevi with pagetoid extension s/p re-excision onright dorsal foot in 2015              Objective   Well appearing patient in no apparent distress; mood and affect are within normal limits.    A full examination was performed including scalp, head, eyes, ears, nose, lips, neck, chest, axillae, abdomen, back, buttocks, bilateral upper extremities, bilateral lower extremities, hands, feet, fingers, toes, fingernails, and toenails. All findings within normal limits unless otherwise noted below.    Assessment/Plan   Skin Exam  1. ACTINIC KERATOSIS (2)  Left Thigh - Anterior, Right Dorsal Hand  Erythematous scaly macule(s) with lichenoid thin papule  Actinic keratosis is favored, also consider irritated seborrheic keratosis     -Discussed nature of diagnosis and treatment options.   -Patient wishes to proceed with Cryotherapy today  -Possible side effects of liquid nitrogen treatment reviewed including formation of blisters, crusting, tenderness, scar, and discoloration which may be permanent.  -Patient advised to return the office for re-evaluation if the treated lesion(s) do not resolve within 4-6 weeks. Patient verbalizes understanding.  Destr of lesion - Left Thigh - Anterior, Right Dorsal Hand  Complexity: simple    Destruction method: cryotherapy    Informed consent: discussed and consent obtained    Lesion destroyed using liquid nitrogen: Yes    Outcome: patient tolerated procedure well with no complications    Post-procedure details: wound care instructions given    2. MULTIPLE BENIGN NEVI  Generalized  Brown and tan macules and papules with reassuring findings on dermoscopy  -These lesions have benign, reassuring patterns on  dermoscopy  -Recommend continued self observation, and to contact the office if any changes in nevi are noticed  3. LENTIGO (2)  Generalized, Left Preauricular Area  Tan macules    One broader patch anterior to left ear. Appears to be the same as compared to photo 12/2022. Re-take photo today  -Benign appearing on exam  -Reassurance, recommend observation  4. SEBORRHEIC KERATOSIS  Generalized  Stuck on, waxy macule(s)/papule(s)/plaque(s) with comedo-like openings and milia like cysts  -Discussed the nature of the diagnosis  -Reassurance, recommend continued observation  5. ACTINIC SKIN DAMAGE  Generalized  Background of photodamage with hyper- and hypo-pigmented macules on the skin  6. PERSONAL HISTORY OF SKIN CANCER  Generalized  Personal History of Non-Melanoma Skin Cancer  -Well healed scar(s) with no evidence of recurrence  -Discussed the need for annual or semi-annual skin examinations and to return sooner if any new or changing lesions are noticed. Patient verbalizes understanding  7. SCREENING EXAM FOR SKIN CANCER  Generalized    Full body skin exam  -No lesions concerning for malignancy on the remainder the skin exam today   - The ugly duckling sign was discussed. Monitor for any skin lesions that are different in color, shape, or size than others on body  -Sun protection was discussed. Recommend SPF 30+, hats with brims, sun protective clothing, and avoiding sun exposure between 10 AM and 2 PM whenever possible  -Recommend regular skin exams or sooner if new or changing lesions     Related Procedures  Follow Up In Dermatology - Established Patient  Follow Up In Dermatology - Established Patient      Follow up 6 months Full Skin Exam

## 2025-06-10 NOTE — Clinical Note
Actinic keratosis is favored, also consider irritated seborrheic keratosis     -Discussed nature of diagnosis and treatment options.   -Patient wishes to proceed with Cryotherapy today  -Possible side effects of liquid nitrogen treatment reviewed including formation of blisters, crusting, tenderness, scar, and discoloration which may be permanent.  -Patient advised to return the office for re-evaluation if the treated lesion(s) do not resolve within 4-6 weeks. Patient verbalizes understanding.

## 2025-06-10 NOTE — Clinical Note
Tan macules    One broader patch anterior to left ear. Appears to be the same as compared to photo 12/2022. Re-take photo today

## 2025-06-26 ENCOUNTER — APPOINTMENT (OUTPATIENT)
Dept: DERMATOLOGY | Facility: CLINIC | Age: 60
End: 2025-06-26
Payer: COMMERCIAL

## 2025-07-23 ENCOUNTER — APPOINTMENT (OUTPATIENT)
Dept: PRIMARY CARE | Facility: CLINIC | Age: 60
End: 2025-07-23
Payer: COMMERCIAL

## 2025-07-23 VITALS
SYSTOLIC BLOOD PRESSURE: 113 MMHG | HEIGHT: 65 IN | TEMPERATURE: 97.6 F | DIASTOLIC BLOOD PRESSURE: 70 MMHG | BODY MASS INDEX: 22.16 KG/M2 | HEART RATE: 56 BPM | WEIGHT: 133 LBS

## 2025-07-23 DIAGNOSIS — Z78.0 ENCOUNTER FOR OSTEOPOROSIS SCREENING IN ASYMPTOMATIC POSTMENOPAUSAL PATIENT: ICD-10-CM

## 2025-07-23 DIAGNOSIS — Z13.820 ENCOUNTER FOR OSTEOPOROSIS SCREENING IN ASYMPTOMATIC POSTMENOPAUSAL PATIENT: ICD-10-CM

## 2025-07-23 DIAGNOSIS — Z13.1 SCREENING FOR DIABETES MELLITUS: ICD-10-CM

## 2025-07-23 DIAGNOSIS — Z23 NEED FOR PNEUMOCOCCAL 20-VALENT CONJUGATE VACCINATION: ICD-10-CM

## 2025-07-23 DIAGNOSIS — Z13.6 SCREENING FOR CARDIOVASCULAR CONDITION: ICD-10-CM

## 2025-07-23 DIAGNOSIS — Z00.00 ANNUAL PHYSICAL EXAM: Primary | ICD-10-CM

## 2025-07-23 DIAGNOSIS — Z12.31 SCREENING MAMMOGRAM FOR BREAST CANCER: ICD-10-CM

## 2025-07-23 DIAGNOSIS — M77.11 LATERAL EPICONDYLITIS OF RIGHT ELBOW: ICD-10-CM

## 2025-07-23 PROCEDURE — 3008F BODY MASS INDEX DOCD: CPT | Performed by: INTERNAL MEDICINE

## 2025-07-23 PROCEDURE — 99396 PREV VISIT EST AGE 40-64: CPT | Performed by: INTERNAL MEDICINE

## 2025-07-23 PROCEDURE — 90677 PCV20 VACCINE IM: CPT | Performed by: INTERNAL MEDICINE

## 2025-07-23 PROCEDURE — 90471 IMMUNIZATION ADMIN: CPT | Performed by: INTERNAL MEDICINE

## 2025-07-23 PROCEDURE — 1036F TOBACCO NON-USER: CPT | Performed by: INTERNAL MEDICINE

## 2025-07-23 ASSESSMENT — PAIN SCALES - GENERAL: PAINLEVEL_OUTOF10: 0-NO PAIN

## 2025-07-23 ASSESSMENT — ENCOUNTER SYMPTOMS
FEVER: 0
PALPITATIONS: 0
SHORTNESS OF BREATH: 0
CHILLS: 0
POLYDIPSIA: 0
COUGH: 0

## 2025-07-23 NOTE — PROGRESS NOTES
"Subjective   Patient ID: Magalys Kay is a 60 y.o. female who presents for Annual Exam.    Patient presents today for an annual wellness exam    Medical history and surgical history updated today  Medication list reconciled and updated  Patient denies vision issues at this time: regular check ups.   Patient denies hearing issues at this time  Current diet:   Current exercise habit: training 2 times a week. Tennis 1-2 times a week. Biking too.   Follows with a dentist: Yes    Patient counseled about available preventative health vaccinations and provided with opportunity to update them with our office or through prescription to preferred pharmacy.    Reviewed and discussed preventative health screening recommendations for colon cancer: 2023- 2028.     Reviewed and discussed preventative health recommendations for screening for cervical cancer and breast cancer     Skin exams every 6 months. Tries to be consistent with skin/sun protections.     DEXA recommended, 59yo,  female, with + FH in sister now known.          Review of Systems   Constitutional:  Negative for chills and fever.   Respiratory:  Negative for cough and shortness of breath.    Cardiovascular:  Negative for chest pain and palpitations.   Endocrine: Negative for polydipsia and polyuria.       Objective   /70 (BP Location: Left arm, Patient Position: Sitting, BP Cuff Size: Adult)   Pulse 56   Temp 36.4 °C (97.6 °F) (Temporal)   Ht 1.651 m (5' 5\")   Wt 60.3 kg (133 lb)   BMI 22.13 kg/m²     Physical Exam  Constitutional:       Appearance: Normal appearance.   HENT:      Head: Normocephalic and atraumatic.      Right Ear: Tympanic membrane normal.      Left Ear: Tympanic membrane normal.      Nose: Nose normal.      Mouth/Throat:      Mouth: Mucous membranes are moist.     Eyes:      Extraocular Movements: Extraocular movements intact.      Conjunctiva/sclera: Conjunctivae normal.      Pupils: Pupils are equal, round, and reactive to " light.     Neck:      Thyroid: No thyroid mass, thyromegaly or thyroid tenderness.      Vascular: No carotid bruit.     Cardiovascular:      Rate and Rhythm: Normal rate and regular rhythm.      Heart sounds: No murmur heard.     No friction rub. No gallop.   Pulmonary:      Effort: Pulmonary effort is normal. No respiratory distress.      Breath sounds: No wheezing, rhonchi or rales.   Abdominal:      General: Bowel sounds are normal.      Palpations: Abdomen is soft.      Tenderness: There is no abdominal tenderness. There is no guarding.      Hernia: No hernia is present.     Musculoskeletal:      Cervical back: Neck supple. No tenderness.      Right lower leg: No edema.      Left lower leg: No edema.   Lymphadenopathy:      Cervical: No cervical adenopathy.     Skin:     Coloration: Skin is not jaundiced.     Neurological:      General: No focal deficit present.      Mental Status: She is alert and oriented to person, place, and time.     Psychiatric:         Mood and Affect: Mood normal.         Assessment/Plan   Assessment & Plan  Annual physical exam    Orders:    Lipid Panel; Future    CBC; Future    Comprehensive Metabolic Panel; Future    Hemoglobin A1C; Future    Encounter for osteoporosis screening in asymptomatic postmenopausal patient         Screening for diabetes mellitus    Orders:    Lipid Panel; Future    CBC; Future    Comprehensive Metabolic Panel; Future    Hemoglobin A1C; Future    Screening for cardiovascular condition    Orders:    Lipid Panel; Future    CBC; Future    Comprehensive Metabolic Panel; Future    Hemoglobin A1C; Future    Screening mammogram for breast cancer    Orders:    BI mammo bilateral screening tomosynthesis; Future    Need for pneumococcal 20-valent conjugate vaccination    Orders:    Pneumococcal conjugate vaccine, 20-valent (PREVNAR 20)    Lateral epicondylitis of right elbow    Orders:    Referral to Occupational Therapy; Future

## 2025-07-24 LAB
ALBUMIN SERPL-MCNC: 4.4 G/DL (ref 3.6–5.1)
ALP SERPL-CCNC: 61 U/L (ref 37–153)
ALT SERPL-CCNC: 18 U/L (ref 6–29)
ANION GAP SERPL CALCULATED.4IONS-SCNC: 8 MMOL/L (CALC) (ref 7–17)
AST SERPL-CCNC: 21 U/L (ref 10–35)
BILIRUB SERPL-MCNC: 0.6 MG/DL (ref 0.2–1.2)
BUN SERPL-MCNC: 12 MG/DL (ref 7–25)
CALCIUM SERPL-MCNC: 9.6 MG/DL (ref 8.6–10.4)
CHLORIDE SERPL-SCNC: 105 MMOL/L (ref 98–110)
CHOLEST SERPL-MCNC: 198 MG/DL
CHOLEST/HDLC SERPL: 2 (CALC)
CO2 SERPL-SCNC: 27 MMOL/L (ref 20–32)
CREAT SERPL-MCNC: 0.79 MG/DL (ref 0.5–1.05)
EGFRCR SERPLBLD CKD-EPI 2021: 85 ML/MIN/1.73M2
ERYTHROCYTE [DISTWIDTH] IN BLOOD BY AUTOMATED COUNT: 12.7 % (ref 11–15)
EST. AVERAGE GLUCOSE BLD GHB EST-MCNC: 111 MG/DL
EST. AVERAGE GLUCOSE BLD GHB EST-SCNC: 6.2 MMOL/L
GLUCOSE SERPL-MCNC: 82 MG/DL (ref 65–99)
HBA1C MFR BLD: 5.5 %
HCT VFR BLD AUTO: 42.9 % (ref 35–45)
HDLC SERPL-MCNC: 97 MG/DL
HGB BLD-MCNC: 13.9 G/DL (ref 11.7–15.5)
LDLC SERPL CALC-MCNC: 88 MG/DL (CALC)
MCH RBC QN AUTO: 30.5 PG (ref 27–33)
MCHC RBC AUTO-ENTMCNC: 32.4 G/DL (ref 32–36)
MCV RBC AUTO: 94.1 FL (ref 80–100)
NONHDLC SERPL-MCNC: 101 MG/DL (CALC)
PLATELET # BLD AUTO: 246 THOUSAND/UL (ref 140–400)
PMV BLD REES-ECKER: 10.1 FL (ref 7.5–12.5)
POTASSIUM SERPL-SCNC: 4.1 MMOL/L (ref 3.5–5.3)
PROT SERPL-MCNC: 6.7 G/DL (ref 6.1–8.1)
RBC # BLD AUTO: 4.56 MILLION/UL (ref 3.8–5.1)
SODIUM SERPL-SCNC: 140 MMOL/L (ref 135–146)
TRIGL SERPL-MCNC: 52 MG/DL
WBC # BLD AUTO: 5.2 THOUSAND/UL (ref 3.8–10.8)

## 2025-07-31 ENCOUNTER — APPOINTMENT (OUTPATIENT)
Dept: DERMATOLOGY | Facility: CLINIC | Age: 60
End: 2025-07-31
Payer: COMMERCIAL

## 2025-09-02 ENCOUNTER — EVALUATION (OUTPATIENT)
Dept: OCCUPATIONAL THERAPY | Facility: CLINIC | Age: 60
End: 2025-09-02
Payer: COMMERCIAL

## 2025-09-02 DIAGNOSIS — M77.11 LATERAL EPICONDYLITIS OF RIGHT ELBOW: Primary | ICD-10-CM

## 2025-09-02 PROCEDURE — 97530 THERAPEUTIC ACTIVITIES: CPT | Mod: GO | Performed by: OCCUPATIONAL THERAPIST

## 2025-09-02 PROCEDURE — 97165 OT EVAL LOW COMPLEX 30 MIN: CPT | Mod: GO | Performed by: OCCUPATIONAL THERAPIST

## 2025-09-05 ENCOUNTER — TREATMENT (OUTPATIENT)
Dept: OCCUPATIONAL THERAPY | Facility: CLINIC | Age: 60
End: 2025-09-05
Payer: COMMERCIAL

## 2025-09-05 DIAGNOSIS — M77.11 LATERAL EPICONDYLITIS OF RIGHT ELBOW: Primary | ICD-10-CM

## 2025-09-05 PROCEDURE — 97110 THERAPEUTIC EXERCISES: CPT | Mod: GO | Performed by: OCCUPATIONAL THERAPIST

## 2025-09-05 PROCEDURE — 97140 MANUAL THERAPY 1/> REGIONS: CPT | Mod: GO | Performed by: OCCUPATIONAL THERAPIST

## 2025-12-10 ENCOUNTER — APPOINTMENT (OUTPATIENT)
Dept: DERMATOLOGY | Facility: CLINIC | Age: 60
End: 2025-12-10
Payer: COMMERCIAL